# Patient Record
Sex: MALE | Race: WHITE | NOT HISPANIC OR LATINO | Employment: UNEMPLOYED | URBAN - METROPOLITAN AREA
[De-identification: names, ages, dates, MRNs, and addresses within clinical notes are randomized per-mention and may not be internally consistent; named-entity substitution may affect disease eponyms.]

---

## 2020-05-05 DIAGNOSIS — I10 ESSENTIAL HYPERTENSION: Primary | ICD-10-CM

## 2020-05-05 RX ORDER — HYDROCHLOROTHIAZIDE 25 MG/1
25 TABLET ORAL DAILY
COMMUNITY
End: 2020-05-05 | Stop reason: SDUPTHER

## 2020-05-05 RX ORDER — METOPROLOL SUCCINATE 100 MG/1
100 TABLET, EXTENDED RELEASE ORAL DAILY
COMMUNITY
End: 2020-05-05 | Stop reason: SDUPTHER

## 2020-05-05 RX ORDER — METOPROLOL SUCCINATE 100 MG/1
100 TABLET, EXTENDED RELEASE ORAL DAILY
Qty: 30 TABLET | Refills: 6 | Status: SHIPPED | OUTPATIENT
Start: 2020-05-05 | End: 2020-12-31

## 2020-05-05 RX ORDER — HYDROCHLOROTHIAZIDE 25 MG/1
25 TABLET ORAL DAILY
Qty: 30 TABLET | Refills: 6 | Status: SHIPPED | OUTPATIENT
Start: 2020-05-05 | End: 2021-01-12 | Stop reason: SDUPTHER

## 2020-12-29 ENCOUNTER — TELEPHONE (OUTPATIENT)
Dept: FAMILY MEDICINE CLINIC | Facility: CLINIC | Age: 60
End: 2020-12-29

## 2020-12-29 DIAGNOSIS — I10 ESSENTIAL HYPERTENSION: Primary | ICD-10-CM

## 2020-12-29 DIAGNOSIS — Z12.5 SCREENING PSA (PROSTATE SPECIFIC ANTIGEN): ICD-10-CM

## 2020-12-29 NOTE — TELEPHONE ENCOUNTER
Patient has an appt  On 1/12 and wants to get bloodwork done before his appt    Psa, diabetes, renal /liver emzymes- what you normally order for him,     Patients ph # [phone]

## 2020-12-31 DIAGNOSIS — I10 ESSENTIAL HYPERTENSION: ICD-10-CM

## 2020-12-31 RX ORDER — METOPROLOL SUCCINATE 100 MG/1
TABLET, EXTENDED RELEASE ORAL
Qty: 30 TABLET | Refills: 6 | Status: SHIPPED | OUTPATIENT
Start: 2020-12-31 | End: 2021-09-24

## 2021-01-11 ENCOUNTER — TRANSCRIBE ORDERS (OUTPATIENT)
Dept: LAB | Facility: CLINIC | Age: 61
End: 2021-01-11

## 2021-01-11 ENCOUNTER — LAB (OUTPATIENT)
Dept: LAB | Facility: CLINIC | Age: 61
End: 2021-01-11
Payer: COMMERCIAL

## 2021-01-11 DIAGNOSIS — I10 ESSENTIAL HYPERTENSION: ICD-10-CM

## 2021-01-11 DIAGNOSIS — Z12.5 SCREENING PSA (PROSTATE SPECIFIC ANTIGEN): ICD-10-CM

## 2021-01-11 LAB
ALBUMIN SERPL BCP-MCNC: 3.6 G/DL (ref 3.5–5)
ALP SERPL-CCNC: 106 U/L (ref 46–116)
ALT SERPL W P-5'-P-CCNC: 68 U/L (ref 12–78)
ANION GAP SERPL CALCULATED.3IONS-SCNC: 4 MMOL/L (ref 4–13)
AST SERPL W P-5'-P-CCNC: 36 U/L (ref 5–45)
BILIRUB SERPL-MCNC: 0.65 MG/DL (ref 0.2–1)
BUN SERPL-MCNC: 15 MG/DL (ref 5–25)
CALCIUM SERPL-MCNC: 8.8 MG/DL (ref 8.3–10.1)
CHLORIDE SERPL-SCNC: 104 MMOL/L (ref 100–108)
CHOLEST SERPL-MCNC: 192 MG/DL (ref 50–200)
CO2 SERPL-SCNC: 30 MMOL/L (ref 21–32)
CREAT SERPL-MCNC: 1 MG/DL (ref 0.6–1.3)
GFR SERPL CREATININE-BSD FRML MDRD: 83 ML/MIN/1.73SQ M
GLUCOSE P FAST SERPL-MCNC: 159 MG/DL (ref 65–99)
HDLC SERPL-MCNC: 39 MG/DL
LDLC SERPL CALC-MCNC: 119 MG/DL (ref 0–100)
NONHDLC SERPL-MCNC: 153 MG/DL
POTASSIUM SERPL-SCNC: 3.6 MMOL/L (ref 3.5–5.3)
PROT SERPL-MCNC: 7.2 G/DL (ref 6.4–8.2)
PSA SERPL-MCNC: 0.8 NG/ML (ref 0–4)
SODIUM SERPL-SCNC: 138 MMOL/L (ref 136–145)
TRIGL SERPL-MCNC: 170 MG/DL

## 2021-01-11 PROCEDURE — G0103 PSA SCREENING: HCPCS

## 2021-01-11 PROCEDURE — 36415 COLL VENOUS BLD VENIPUNCTURE: CPT

## 2021-01-11 PROCEDURE — 80053 COMPREHEN METABOLIC PANEL: CPT

## 2021-01-11 PROCEDURE — 80061 LIPID PANEL: CPT

## 2021-01-12 ENCOUNTER — OFFICE VISIT (OUTPATIENT)
Dept: FAMILY MEDICINE CLINIC | Facility: CLINIC | Age: 61
End: 2021-01-12
Payer: COMMERCIAL

## 2021-01-12 VITALS — WEIGHT: 270 LBS | BODY MASS INDEX: 40.92 KG/M2 | HEIGHT: 68 IN

## 2021-01-12 DIAGNOSIS — R73.09 ABNORMAL GLUCOSE: Primary | ICD-10-CM

## 2021-01-12 DIAGNOSIS — E78.5 DYSLIPIDEMIA: ICD-10-CM

## 2021-01-12 DIAGNOSIS — E66.09 OBESITY DUE TO EXCESS CALORIES WITHOUT SERIOUS COMORBIDITY, UNSPECIFIED CLASSIFICATION: ICD-10-CM

## 2021-01-12 DIAGNOSIS — I10 ESSENTIAL HYPERTENSION: ICD-10-CM

## 2021-01-12 PROCEDURE — 99214 OFFICE O/P EST MOD 30 MIN: CPT | Performed by: FAMILY MEDICINE

## 2021-01-12 RX ORDER — HYDROCHLOROTHIAZIDE 25 MG/1
25 TABLET ORAL DAILY
Qty: 30 TABLET | Refills: 6 | Status: SHIPPED | OUTPATIENT
Start: 2021-01-12 | End: 2021-10-12

## 2021-01-12 NOTE — PROGRESS NOTES
Virtual Regular Visit      Assessment/Plan:    Problem List Items Addressed This Visit        Cardiovascular and Mediastinum    Essential hypertension     Refill meds; pt to check BP at h omne if high will need to come in office for visit         Relevant Medications    hydrochlorothiazide (HYDRODIURIL) 25 mg tablet       Other    Abnormal glucose - Primary     Pt needs HGA1c ordered         Relevant Orders    Hemoglobin A1C    Dyslipidemia     Borderline chol and trigs cont low saturated fat diet         Obesity due to excess calories without serious comorbidity     Discussion on diet and exercise                    Reason for visit is  interval visit  Chief Complaint   Patient presents with    Hypertension    Virtual Regular Visit        Encounter provider Carlos Waldrop MD    Provider located at [address] [place] [id number] [phone]      Recent Visits  No visits were found meeting these conditions  Showing recent visits within past 7 days and meeting all other requirements     Today's Visits  Date Type Provider Dept   01/12/21 Office Visit Carlos Waldrop MD [address] today's visits and meeting all other requirements     Future Appointments  No visits were found meeting these conditions  Showing future appointments within next 150 days and meeting all other requirements        The patient was identified by name and date of birth  Dallas Persons was informed that this is a telemedicine visit and that the visit is being conducted through [address] [place] [address] and patient was informed that this is not a secure, HIPAA-compliant platform  He agrees to proceed     My office door was closed  No one else was in the room  He acknowledged consent and understanding of privacy and security of the video platform  The patient has agreed to participate and understands they can discontinue the visit at any time      Patient is aware this is a billable service  Subjective pt for interval visit  Has HTN   Sherley Frost is a 64 y o  male here for interval visit needs refill of meds pt has not been getting exercise pt does not think he gained weight  Pt had fasting blood glucose of 159 needs HGA1c pt has been eating a lot of candy       Past Medical History:   Diagnosis Date    Arthritis     High blood pressure     Hypertension     Obesity     LISA (obstructive sleep apnea)     Sleep disorder     lisa       Past Surgical History:   Procedure Laterality Date    COLONOSCOPY  11/04/2014    CORRECTION HAMMER TOE Right     great toe    JOINT REPLACEMENT      KNEE SURGERY Bilateral 12/01/2015    done at same time        Current Outpatient Medications   Medication Sig Dispense Refill    hydrochlorothiazide (HYDRODIURIL) 25 mg tablet Take 1 tablet (25 mg total) by mouth daily 30 tablet 6    metoprolol succinate (TOPROL-XL) 100 mg 24 hr tablet TAKE ONE TABLET BY MOUTH EVERY DAY 30 tablet 6     No current facility-administered medications for this visit  No Known Allergies    Review of Systems   Respiratory: Negative for cough, chest tightness and shortness of breath  Cardiovascular: Negative for chest pain, palpitations and leg swelling  Neurological: Negative for dizziness, weakness, light-headedness and headaches  Psychiatric/Behavioral: Negative for dysphoric mood  The patient is not nervous/anxious  Video Exam    Vitals:    01/12/21 1013   Weight: 122 kg (270 lb)   Height: 5' 8" (1 727 m)       Physical Exam  Constitutional:       General: He is not in acute distress  Appearance: Normal appearance  He is well-developed  He is obese  Neck:      Musculoskeletal: Normal range of motion  Thyroid: No thyromegaly  Cardiovascular:      Rate and Rhythm: Normal rate  Pulmonary:      Effort: Pulmonary effort is normal  No respiratory distress  Breath sounds: Normal breath sounds     Neurological: General: No focal deficit present  Mental Status: He is alert and oriented to person, place, and time  Mental status is at baseline  Psychiatric:         Mood and Affect: Mood normal          Behavior: Behavior normal          Thought Content: Thought content normal       BMI Counseling: Body mass index is 41 05 kg/m²  The BMI is above normal  Nutrition recommendations include 3-5 servings of fruits/vegetables daily, reducing fast food intake, consuming healthier snacks, decreasing soda and/or juice intake, moderation in carbohydrate intake and increasing intake of lean protein  Exercise recommendations include exercising 3-5 times per week and strength training exercises  I spent 20 minutes directly with the patient during this visit      VIRTUAL VISIT DISCLAIMER    Chetna Arevalo acknowledges that he has consented to an online visit or consultation  He understands that the online visit is based solely on information provided by him, and that, in the absence of a face-to-face physical evaluation by the physician, the diagnosis he receives is both limited and provisional in terms of accuracy and completeness  This is not intended to replace a full medical face-to-face evaluation by the physician  Chetna Arevalo understands and accepts these terms

## 2021-01-14 ENCOUNTER — LAB (OUTPATIENT)
Dept: LAB | Facility: CLINIC | Age: 61
End: 2021-01-14
Payer: COMMERCIAL

## 2021-01-14 DIAGNOSIS — R73.09 ABNORMAL GLUCOSE: ICD-10-CM

## 2021-01-14 DIAGNOSIS — E11.9 DIABETES MELLITUS WITHOUT COMPLICATION (HCC): Primary | ICD-10-CM

## 2021-01-14 LAB
EST. AVERAGE GLUCOSE BLD GHB EST-MCNC: 148 MG/DL
HBA1C MFR BLD: 6.8 %

## 2021-01-14 PROCEDURE — 36415 COLL VENOUS BLD VENIPUNCTURE: CPT

## 2021-01-14 PROCEDURE — 83036 HEMOGLOBIN GLYCOSYLATED A1C: CPT

## 2021-01-18 ENCOUNTER — TELEPHONE (OUTPATIENT)
Dept: FAMILY MEDICINE CLINIC | Facility: CLINIC | Age: 61
End: 2021-01-18

## 2021-03-10 DIAGNOSIS — Z23 ENCOUNTER FOR IMMUNIZATION: ICD-10-CM

## 2021-05-09 PROBLEM — E66.01 CLASS 3 SEVERE OBESITY DUE TO EXCESS CALORIES WITH SERIOUS COMORBIDITY AND BODY MASS INDEX (BMI) OF 40.0 TO 44.9 IN ADULT (HCC): Status: ACTIVE | Noted: 2021-05-09

## 2021-05-09 PROBLEM — Z00.00 ANNUAL PHYSICAL EXAM: Status: ACTIVE | Noted: 2021-05-09

## 2021-05-09 PROBLEM — E66.813 CLASS 3 SEVERE OBESITY DUE TO EXCESS CALORIES WITH SERIOUS COMORBIDITY AND BODY MASS INDEX (BMI) OF 40.0 TO 44.9 IN ADULT (HCC): Status: ACTIVE | Noted: 2021-05-09

## 2021-05-09 PROBLEM — E11.9 TYPE 2 DIABETES MELLITUS WITHOUT COMPLICATION, WITHOUT LONG-TERM CURRENT USE OF INSULIN (HCC): Status: ACTIVE | Noted: 2021-05-09

## 2021-05-09 NOTE — ASSESSMENT & PLAN NOTE
Lab Results   Component Value Date    HGBA1C 6 8 (H) 01/14/2021    HGA1c today 6 5  Pt very motivated to cont with diet and exercise will check again in 4 mo; needs labs  Pt refuses pneumonia vaccine

## 2021-05-09 NOTE — PROGRESS NOTES
Assessment/Plan:         Problem List Items Addressed This Visit        Endocrine    Type 2 diabetes mellitus without complication, without long-term current use of insulin ([address] )       Lab Results   Component Value Date    HGBA1C 6 8 (H) 01/14/2021    HGA1c today 6 5  Pt very motivated to cont with diet and exercise will check again in 4 mo; needs labs  Pt refuses pneumonia vaccine         Relevant Orders    Microalbumin / creatinine urine ratio    POCT hemoglobin A1c (Completed)       Other    Dyslipidemia     Check labs         Annual physical exam    Relevant Orders    Comprehensive metabolic panel    Lipid panel    PSA, Total Screen    CBC and differential    Urinalysis with microscopic    Class 3 severe obesity due to excess calories with serious comorbidity and body mass index (BMI) of 40 0 to 44 9 in [facility]) - Primary     Discussion on diet and exercise           Other Visit Diagnoses     History of colon polyps        Relevant Orders    Ambulatory referral for colonoscopy            Subjective:pt here for interval visit and annual physical htn hl DM pt states he is exercising every day     Patient ID: Beni Cunningham is a 64 y o  male  HPI    The following portions of the patient's history were reviewed and updated as appropriate:   Past Medical History:  He has a past medical history of Arthritis, High blood pressure, Hypertension, Obesity, LISA (obstructive sleep apnea), and Sleep disorder  ,  _______________________________________________________________________  Medical Problems:  does not have any pertinent problems on file ,  _______________________________________________________________________  Past Surgical History:   has a past surgical history that includes Correction hammer toe (Right); Joint replacement; Knee surgery (Bilateral, 12/01/2015); and Colonoscopy (11/04/2014)  ,  _______________________________________________________________________  Family History:  family history includes Alcohol abuse in his family; Arthritis in his mother; Breast cancer in his mother; Colon cancer in his mother; Diabetes in his paternal grandfather; Hypertension in his father; No Known Problems in his brother and sister; Prostate cancer in his father ,  _______________________________________________________________________  Social History:   reports that he has been smoking cigars  He has smoked for the past 40 00 years  He has never used smokeless tobacco  He reports current alcohol use of about 3 0 - 4 0 standard drinks of alcohol per week  He reports previous drug use ,  _______________________________________________________________________  Allergies:  has No Known Allergies     _______________________________________________________________________  Current Outpatient Medications   Medication Sig Dispense Refill    hydrochlorothiazide (HYDRODIURIL) 25 mg tablet Take 1 tablet (25 mg total) by mouth daily 30 tablet 6    metoprolol succinate (TOPROL-XL) 100 mg 24 hr tablet TAKE ONE TABLET BY MOUTH EVERY DAY 30 tablet 6     No current facility-administered medications for this visit       _______________________________________________________________________  Review of Systems   Constitutional: Negative for appetite change, chills, fatigue and fever  Respiratory: Negative for cough, chest tightness and shortness of breath  Cardiovascular: Negative for chest pain, palpitations and leg swelling  Gastrointestinal: Negative for abdominal pain, constipation, diarrhea, nausea and vomiting  Genitourinary: Negative for difficulty urinating and frequency  Musculoskeletal: Negative for arthralgias, back pain and neck pain  Skin: Negative for rash  Neurological: Negative for dizziness, weakness, light-headedness, numbness and headaches  Hematological: Does not bruise/bleed easily  Psychiatric/Behavioral: Negative for dysphoric mood and sleep disturbance  The patient is not nervous/anxious  Objective:  Vitals:    05/12/21 0935 05/12/21 0957   BP: 120/90 124/84   Pulse: 72    Temp: 98 1 °F (36 7 °C)    SpO2: 97%    Weight: 129 kg (284 lb)    Height: 5' 8" (1 727 m)      Body mass index is 43 18 kg/m²  Physical Exam  Constitutional:       General: He is not in acute distress  Appearance: Normal appearance  He is obese  He is not ill-appearing  HENT:      Right Ear: Tympanic membrane and external ear normal       Left Ear: Tympanic membrane and external ear normal       Nose: Nose normal       Mouth/Throat:      Mouth: Mucous membranes are moist    Eyes:      General:         Right eye: No discharge  Left eye: No discharge  Extraocular Movements: Extraocular movements intact  Conjunctiva/sclera: Conjunctivae normal       Pupils: Pupils are equal, round, and reactive to light  Neck:      Musculoskeletal: Normal range of motion and neck supple  Thyroid: No thyromegaly  Vascular: No carotid bruit  Trachea: No tracheal deviation  Cardiovascular:      Rate and Rhythm: Normal rate and regular rhythm  Pulses: Normal pulses  Heart sounds: Normal heart sounds  No murmur  Pulmonary:      Effort: Pulmonary effort is normal       Breath sounds: Normal breath sounds  No wheezing or rales  Chest:      Chest wall: No tenderness  Abdominal:      General: Bowel sounds are normal  There is no distension  Palpations: Abdomen is soft  There is no mass  Tenderness: There is no abdominal tenderness  Hernia: No hernia is present  There is no hernia in the left inguinal area  Genitourinary:     Penis: Normal        Scrotum/Testes: Normal       Prostate: Normal       Rectum: Normal    Musculoskeletal: Normal range of motion  Feet:      Right foot:      Skin integrity: Dry skin present  No ulcer, skin breakdown, erythema, warmth or callus  Left foot:      Skin integrity: Dry skin present  No ulcer, skin breakdown, erythema, warmth or callus  Lymphadenopathy:      Cervical: No cervical adenopathy  Lower Body: No right inguinal adenopathy  No left inguinal adenopathy  Skin:     General: Skin is warm and dry  Findings: No rash  Neurological:      General: No focal deficit present  Mental Status: He is alert and oriented to person, place, and time  Mental status is at baseline  Cranial Nerves: No cranial nerve deficit  Sensory: No sensory deficit  Motor: No weakness or abnormal muscle tone  Coordination: Coordination normal       Gait: Gait normal       Deep Tendon Reflexes: Reflexes normal    Psychiatric:         Mood and Affect: Mood normal          Behavior: Behavior normal        BMI Counseling: Body mass index is 43 18 kg/m²  The BMI is above normal  Nutrition recommendations include reducing portion sizes, 3-5 servings of fruits/vegetables daily, reducing fast food intake, consuming healthier snacks, decreasing soda and/or juice intake, moderation in carbohydrate intake and increasing intake of lean protein  Exercise recommendations include exercising 3-5 times per week and strength training exercises  Patient's shoes and socks removed  Right Foot/Ankle   Right Foot Inspection  Skin Exam: skin normal, skin intact and dry skin no warmth, no callus, no erythema, no maceration, no abnormal color, no pre-ulcer, no ulcer and no callus                          Toe Exam: no swelling, no tenderness, erythema and  no right toe deformity  Sensory   Vibration: intact  Proprioception: intact   Monofilament testing: intact    Right Toe  - Comprehensive Exam  Ecchymosis: none  Swelling: none   Tenderness: none         Left Foot/Ankle  Left Foot Inspection  Skin Exam: skin normal, skin intact and dry skinno warmth, no erythema, no maceration, normal color, no pre-ulcer, no ulcer and no callus                                         Sensory   Vibration: intact  Proprioception: intact  Monofilament: intact    Left Toe  - Comprehensive Exam  Ecchymosis: none  Swelling: none   Tenderness: none       Assign Risk Category:  No deformity present;  No loss of protective sensation;        Risk: 0

## 2021-05-12 ENCOUNTER — OFFICE VISIT (OUTPATIENT)
Dept: FAMILY MEDICINE CLINIC | Facility: CLINIC | Age: 61
End: 2021-05-12
Payer: COMMERCIAL

## 2021-05-12 VITALS
WEIGHT: 284 LBS | DIASTOLIC BLOOD PRESSURE: 84 MMHG | OXYGEN SATURATION: 97 % | TEMPERATURE: 98.1 F | HEART RATE: 72 BPM | HEIGHT: 68 IN | SYSTOLIC BLOOD PRESSURE: 124 MMHG | BODY MASS INDEX: 43.04 KG/M2

## 2021-05-12 DIAGNOSIS — Z00.00 ANNUAL PHYSICAL EXAM: ICD-10-CM

## 2021-05-12 DIAGNOSIS — E11.9 TYPE 2 DIABETES MELLITUS WITHOUT COMPLICATION, WITHOUT LONG-TERM CURRENT USE OF INSULIN (HCC): ICD-10-CM

## 2021-05-12 DIAGNOSIS — E78.5 DYSLIPIDEMIA: ICD-10-CM

## 2021-05-12 DIAGNOSIS — Z86.010 HISTORY OF COLON POLYPS: ICD-10-CM

## 2021-05-12 DIAGNOSIS — E66.01 CLASS 3 SEVERE OBESITY DUE TO EXCESS CALORIES WITH SERIOUS COMORBIDITY AND BODY MASS INDEX (BMI) OF 40.0 TO 44.9 IN ADULT (HCC): Primary | ICD-10-CM

## 2021-05-12 PROBLEM — R73.09 ABNORMAL GLUCOSE: Status: RESOLVED | Noted: 2021-01-12 | Resolved: 2021-05-12

## 2021-05-12 PROBLEM — E66.09 OBESITY DUE TO EXCESS CALORIES WITHOUT SERIOUS COMORBIDITY: Status: RESOLVED | Noted: 2021-01-12 | Resolved: 2021-05-12

## 2021-05-12 LAB — SL AMB POCT HEMOGLOBIN AIC: 6.5 (ref ?–6.5)

## 2021-05-12 PROCEDURE — 83036 HEMOGLOBIN GLYCOSYLATED A1C: CPT | Performed by: FAMILY MEDICINE

## 2021-05-12 PROCEDURE — 99396 PREV VISIT EST AGE 40-64: CPT | Performed by: FAMILY MEDICINE

## 2021-05-12 PROCEDURE — 36416 COLLJ CAPILLARY BLOOD SPEC: CPT | Performed by: FAMILY MEDICINE

## 2021-05-13 ENCOUNTER — TRANSCRIBE ORDERS (OUTPATIENT)
Dept: LAB | Facility: CLINIC | Age: 61
End: 2021-05-13

## 2021-05-13 ENCOUNTER — APPOINTMENT (OUTPATIENT)
Dept: LAB | Facility: CLINIC | Age: 61
End: 2021-05-13
Payer: COMMERCIAL

## 2021-05-13 DIAGNOSIS — Z00.00 ANNUAL PHYSICAL EXAM: ICD-10-CM

## 2021-05-13 DIAGNOSIS — E11.9 DIABETES MELLITUS WITHOUT COMPLICATION (HCC): ICD-10-CM

## 2021-05-13 LAB
ALBUMIN SERPL BCP-MCNC: 3.6 G/DL (ref 3.5–5)
ALP SERPL-CCNC: 89 U/L (ref 46–116)
ALT SERPL W P-5'-P-CCNC: 40 U/L (ref 12–78)
ANION GAP SERPL CALCULATED.3IONS-SCNC: 3 MMOL/L (ref 4–13)
AST SERPL W P-5'-P-CCNC: 25 U/L (ref 5–45)
BACTERIA UR QL AUTO: NORMAL /HPF
BASOPHILS # BLD AUTO: 0.05 THOUSANDS/ΜL (ref 0–0.1)
BASOPHILS NFR BLD AUTO: 1 % (ref 0–1)
BILIRUB SERPL-MCNC: 0.51 MG/DL (ref 0.2–1)
BILIRUB UR QL STRIP: NEGATIVE
BUN SERPL-MCNC: 14 MG/DL (ref 5–25)
CALCIUM SERPL-MCNC: 9.1 MG/DL (ref 8.3–10.1)
CHLORIDE SERPL-SCNC: 102 MMOL/L (ref 100–108)
CHOLEST SERPL-MCNC: 192 MG/DL (ref 50–200)
CLARITY UR: CLEAR
CO2 SERPL-SCNC: 32 MMOL/L (ref 21–32)
COLOR UR: YELLOW
CREAT SERPL-MCNC: 0.9 MG/DL (ref 0.6–1.3)
CREAT UR-MCNC: 144 MG/DL
EOSINOPHIL # BLD AUTO: 0.21 THOUSAND/ΜL (ref 0–0.61)
EOSINOPHIL NFR BLD AUTO: 3 % (ref 0–6)
ERYTHROCYTE [DISTWIDTH] IN BLOOD BY AUTOMATED COUNT: 13.1 % (ref 11.6–15.1)
EST. AVERAGE GLUCOSE BLD GHB EST-MCNC: 131 MG/DL
GFR SERPL CREATININE-BSD FRML MDRD: 94 ML/MIN/1.73SQ M
GLUCOSE P FAST SERPL-MCNC: 131 MG/DL (ref 65–99)
GLUCOSE UR STRIP-MCNC: NEGATIVE MG/DL
HBA1C MFR BLD: 6.2 %
HCT VFR BLD AUTO: 42.8 % (ref 36.5–49.3)
HDLC SERPL-MCNC: 50 MG/DL
HGB BLD-MCNC: 14.7 G/DL (ref 12–17)
HGB UR QL STRIP.AUTO: NEGATIVE
IMM GRANULOCYTES # BLD AUTO: 0.02 THOUSAND/UL (ref 0–0.2)
IMM GRANULOCYTES NFR BLD AUTO: 0 % (ref 0–2)
KETONES UR STRIP-MCNC: NEGATIVE MG/DL
LDLC SERPL CALC-MCNC: 118 MG/DL (ref 0–100)
LEUKOCYTE ESTERASE UR QL STRIP: NEGATIVE
LYMPHOCYTES # BLD AUTO: 2.57 THOUSANDS/ΜL (ref 0.6–4.47)
LYMPHOCYTES NFR BLD AUTO: 35 % (ref 14–44)
MCH RBC QN AUTO: 32.7 PG (ref 26.8–34.3)
MCHC RBC AUTO-ENTMCNC: 34.3 G/DL (ref 31.4–37.4)
MCV RBC AUTO: 95 FL (ref 82–98)
MICROALBUMIN UR-MCNC: <5 MG/L (ref 0–20)
MICROALBUMIN/CREAT 24H UR: <3 MG/G CREATININE (ref 0–30)
MONOCYTES # BLD AUTO: 0.64 THOUSAND/ΜL (ref 0.17–1.22)
MONOCYTES NFR BLD AUTO: 9 % (ref 4–12)
NEUTROPHILS # BLD AUTO: 3.77 THOUSANDS/ΜL (ref 1.85–7.62)
NEUTS SEG NFR BLD AUTO: 52 % (ref 43–75)
NITRITE UR QL STRIP: NEGATIVE
NON-SQ EPI CELLS URNS QL MICRO: NORMAL /HPF
NONHDLC SERPL-MCNC: 142 MG/DL
NRBC BLD AUTO-RTO: 0 /100 WBCS
PH UR STRIP.AUTO: 7 [PH]
PLATELET # BLD AUTO: 227 THOUSANDS/UL (ref 149–390)
PMV BLD AUTO: 9.8 FL (ref 8.9–12.7)
POTASSIUM SERPL-SCNC: 4 MMOL/L (ref 3.5–5.3)
PROT SERPL-MCNC: 7.1 G/DL (ref 6.4–8.2)
PROT UR STRIP-MCNC: NEGATIVE MG/DL
PSA SERPL-MCNC: 0.7 NG/ML (ref 0–4)
RBC # BLD AUTO: 4.5 MILLION/UL (ref 3.88–5.62)
RBC #/AREA URNS AUTO: NORMAL /HPF
SODIUM SERPL-SCNC: 137 MMOL/L (ref 136–145)
SP GR UR STRIP.AUTO: 1.02 (ref 1–1.03)
TRIGL SERPL-MCNC: 119 MG/DL
UROBILINOGEN UR QL STRIP.AUTO: 0.2 E.U./DL
WBC # BLD AUTO: 7.26 THOUSAND/UL (ref 4.31–10.16)
WBC #/AREA URNS AUTO: NORMAL /HPF

## 2021-05-13 PROCEDURE — 82570 ASSAY OF URINE CREATININE: CPT | Performed by: FAMILY MEDICINE

## 2021-05-13 PROCEDURE — 81001 URINALYSIS AUTO W/SCOPE: CPT | Performed by: FAMILY MEDICINE

## 2021-05-13 PROCEDURE — 83036 HEMOGLOBIN GLYCOSYLATED A1C: CPT

## 2021-05-13 PROCEDURE — G0103 PSA SCREENING: HCPCS

## 2021-05-13 PROCEDURE — 36415 COLL VENOUS BLD VENIPUNCTURE: CPT | Performed by: FAMILY MEDICINE

## 2021-05-13 PROCEDURE — 80061 LIPID PANEL: CPT | Performed by: FAMILY MEDICINE

## 2021-05-13 PROCEDURE — 85025 COMPLETE CBC W/AUTO DIFF WBC: CPT

## 2021-05-13 PROCEDURE — 80053 COMPREHEN METABOLIC PANEL: CPT | Performed by: FAMILY MEDICINE

## 2021-05-13 PROCEDURE — 82043 UR ALBUMIN QUANTITATIVE: CPT | Performed by: FAMILY MEDICINE

## 2021-07-09 ENCOUNTER — TELEPHONE (OUTPATIENT)
Dept: GASTROENTEROLOGY | Facility: CLINIC | Age: 61
End: 2021-07-09

## 2021-07-09 NOTE — TELEPHONE ENCOUNTER
Received order from Dr Ector Mcdonald for pt to be scheduled for colon screening, however  Pt is an established pt of Dr Rufina Hernandez and is overdue for colon/egd - hx of ji's/hx of adenomatous colon polyps  Called and spoke to pt whom is refusing to schedule  He said he will call when he is ready

## 2021-09-24 DIAGNOSIS — I10 ESSENTIAL HYPERTENSION: ICD-10-CM

## 2021-09-24 RX ORDER — METOPROLOL SUCCINATE 100 MG/1
TABLET, EXTENDED RELEASE ORAL
Qty: 30 TABLET | Refills: 6 | Status: SHIPPED | OUTPATIENT
Start: 2021-09-24 | End: 2022-05-23

## 2021-10-12 DIAGNOSIS — I10 ESSENTIAL HYPERTENSION: ICD-10-CM

## 2021-10-12 RX ORDER — HYDROCHLOROTHIAZIDE 25 MG/1
TABLET ORAL
Qty: 30 TABLET | Refills: 6 | Status: SHIPPED | OUTPATIENT
Start: 2021-10-12 | End: 2022-06-14

## 2021-12-21 ENCOUNTER — TELEPHONE (OUTPATIENT)
Dept: GASTROENTEROLOGY | Facility: CLINIC | Age: 61
End: 2021-12-21

## 2021-12-21 ENCOUNTER — TELEPHONE (OUTPATIENT)
Dept: FAMILY MEDICINE CLINIC | Facility: CLINIC | Age: 61
End: 2021-12-21

## 2021-12-21 DIAGNOSIS — B34.9 VIRAL INFECTION, UNSPECIFIED: Primary | ICD-10-CM

## 2021-12-22 PROCEDURE — U0005 INFEC AGEN DETEC AMPLI PROBE: HCPCS | Performed by: FAMILY MEDICINE

## 2021-12-22 PROCEDURE — U0003 INFECTIOUS AGENT DETECTION BY NUCLEIC ACID (DNA OR RNA); SEVERE ACUTE RESPIRATORY SYNDROME CORONAVIRUS 2 (SARS-COV-2) (CORONAVIRUS DISEASE [COVID-19]), AMPLIFIED PROBE TECHNIQUE, MAKING USE OF HIGH THROUGHPUT TECHNOLOGIES AS DESCRIBED BY CMS-2020-01-R: HCPCS | Performed by: FAMILY MEDICINE

## 2022-02-03 ENCOUNTER — OFFICE VISIT (OUTPATIENT)
Dept: FAMILY MEDICINE CLINIC | Facility: CLINIC | Age: 62
End: 2022-02-03
Payer: COMMERCIAL

## 2022-02-03 VITALS
DIASTOLIC BLOOD PRESSURE: 88 MMHG | HEART RATE: 75 BPM | RESPIRATION RATE: 16 BRPM | SYSTOLIC BLOOD PRESSURE: 130 MMHG | BODY MASS INDEX: 43.19 KG/M2 | WEIGHT: 285 LBS | HEIGHT: 68 IN | TEMPERATURE: 97.1 F | OXYGEN SATURATION: 98 %

## 2022-02-03 DIAGNOSIS — E11.9 TYPE 2 DIABETES MELLITUS WITHOUT COMPLICATION, WITHOUT LONG-TERM CURRENT USE OF INSULIN (HCC): Primary | ICD-10-CM

## 2022-02-03 DIAGNOSIS — E78.5 DYSLIPIDEMIA: ICD-10-CM

## 2022-02-03 DIAGNOSIS — E66.01 CLASS 3 SEVERE OBESITY DUE TO EXCESS CALORIES WITH SERIOUS COMORBIDITY AND BODY MASS INDEX (BMI) OF 40.0 TO 44.9 IN ADULT (HCC): ICD-10-CM

## 2022-02-03 DIAGNOSIS — F41.1 GENERALIZED ANXIETY DISORDER: ICD-10-CM

## 2022-02-03 DIAGNOSIS — I10 ESSENTIAL HYPERTENSION: ICD-10-CM

## 2022-02-03 PROCEDURE — 3075F SYST BP GE 130 - 139MM HG: CPT | Performed by: FAMILY MEDICINE

## 2022-02-03 PROCEDURE — 99214 OFFICE O/P EST MOD 30 MIN: CPT | Performed by: FAMILY MEDICINE

## 2022-02-03 PROCEDURE — 3008F BODY MASS INDEX DOCD: CPT | Performed by: FAMILY MEDICINE

## 2022-02-03 PROCEDURE — 3079F DIAST BP 80-89 MM HG: CPT | Performed by: FAMILY MEDICINE

## 2022-02-03 RX ORDER — METHYLPREDNISOLONE 4 MG/1
TABLET ORAL
COMMUNITY
Start: 2022-01-15 | End: 2022-02-03

## 2022-02-03 RX ORDER — ALPRAZOLAM 0.25 MG/1
0.25 TABLET ORAL
Qty: 20 TABLET | Refills: 0 | Status: SHIPPED | OUTPATIENT
Start: 2022-02-03 | End: 2022-03-08

## 2022-02-03 NOTE — ASSESSMENT & PLAN NOTE
Lab Results   Component Value Date    HGBA1C 6 2 (H) 05/13/2021   pt due for labs diet controlled HGA1c better

## 2022-02-03 NOTE — PROGRESS NOTES
Assessment/Plan:         Problem List Items Addressed This Visit        Endocrine    Type 2 diabetes mellitus without complication, without long-term current use of insulin ([facility] [address] ) - Primary       Lab Results   Component Value Date    HGBA1C 6 2 (H) 05/13/2021   pt due for labs         Relevant Orders    Comprehensive metabolic panel    Hemoglobin A1C    Lipid panel    Microalbumin / creatinine urine ratio       Cardiovascular and Mediastinum    Essential hypertension     Well controlled on current therapy continue with current medications and will reassess next visit              Other    Dyslipidemia     Borderline  elevated LDL         Class 3 severe obesity due to excess calories with serious comorbidity and body mass index (BMI) of 40 0 to 44 9 in adult ([facility] [address] )     Pt needs to lose weight diet and exercise         Generalized anxiety disorder     Needs refill of xanax         Relevant Medications    ALPRAZolam (XANAX) 0 25 mg tablet            Subjective: pt here for interval visit DM HTN HL  NEIDA     Patient ID: Maylin Jaimes is a 63 y o  male  HPI    The following portions of the patient's history were reviewed and updated as appropriate:   Past Medical History:  He has a past medical history of Arthritis, High blood pressure, Hypertension, Obesity, LISA (obstructive sleep apnea), and Sleep disorder  ,  _______________________________________________________________________  Medical Problems:  does not have any pertinent problems on file ,  _______________________________________________________________________  Past Surgical History:   has a past surgical history that includes Correction hammer toe (Right); Joint replacement; Knee surgery (Bilateral, 12/01/2015); and Colonoscopy (11/04/2014)  ,  _______________________________________________________________________  Family History:  family history includes Alcohol abuse in his family;  Arthritis in his mother; Breast cancer in his mother; Colon cancer in his mother; Diabetes in his paternal grandfather; Hypertension in his father; No Known Problems in his brother and sister; Prostate cancer in his father ,  _______________________________________________________________________  Social History:   reports that he has been smoking cigars  He has smoked for the past 40 00 years  He has never used smokeless tobacco  He reports current alcohol use of about 3 0 - 4 0 standard drinks of alcohol per week  He reports previous drug use ,  _______________________________________________________________________  Allergies:  has No Known Allergies     _______________________________________________________________________  Current Outpatient Medications   Medication Sig Dispense Refill    hydrochlorothiazide (HYDRODIURIL) 25 mg tablet TAKE ONE TABLET BY MOUTH EVERY DAY 30 tablet 6    metoprolol succinate (TOPROL-XL) 100 mg 24 hr tablet TAKE ONE TABLET BY MOUTH EVERY DAY 30 tablet 6    ALPRAZolam (XANAX) 0 25 mg tablet Take 1 tablet (0 25 mg total) by mouth daily at bedtime as needed for anxiety 20 tablet 0     No current facility-administered medications for this visit      _______________________________________________________________________  Review of Systems   Constitutional: Negative for appetite change, chills, fatigue and fever  Respiratory: Negative for cough, chest tightness and shortness of breath  Cardiovascular: Negative for chest pain, palpitations and leg swelling  Gastrointestinal: Negative for abdominal pain, constipation, diarrhea, nausea and vomiting  Genitourinary: Negative for difficulty urinating and frequency  Musculoskeletal: Negative for arthralgias, back pain and neck pain  Skin: Negative for rash  Neurological: Negative for dizziness, weakness, light-headedness, numbness and headaches  Hematological: Does not bruise/bleed easily  Psychiatric/Behavioral: Negative for dysphoric mood and sleep disturbance   The patient is not nervous/anxious  Objective:  Vitals:    02/03/22 0923   BP: 130/88   BP Location: Left arm   Patient Position: Sitting   Cuff Size: Standard   Pulse: 75   Resp: 16   Temp: (!) 97 1 °F (36 2 °C)   TempSrc: Temporal   SpO2: 98%   Weight: 129 kg (285 lb)   Height: 5' 8" (1 727 m)     Body mass index is 43 33 kg/m²  Physical Exam  Vitals reviewed  Constitutional:       General: He is not in acute distress  Appearance: Normal appearance  He is well-developed  He is obese  He is not ill-appearing  HENT:      Mouth/Throat:      Mouth: Mucous membranes are moist    Eyes:      Extraocular Movements: Extraocular movements intact  Conjunctiva/sclera: Conjunctivae normal       Pupils: Pupils are equal, round, and reactive to light  Neck:      Thyroid: No thyromegaly  Vascular: No carotid bruit  Cardiovascular:      Rate and Rhythm: Normal rate and regular rhythm  Pulses: Normal pulses  Heart sounds: Normal heart sounds  No murmur heard  Pulmonary:      Effort: Pulmonary effort is normal       Breath sounds: Normal breath sounds  Chest:      Chest wall: No tenderness  Abdominal:      General: Bowel sounds are normal  There is no distension  Palpations: Abdomen is soft  Tenderness: There is no abdominal tenderness  Musculoskeletal:      Cervical back: Normal range of motion and neck supple  Lymphadenopathy:      Cervical: No cervical adenopathy  Skin:     General: Skin is warm and dry  Neurological:      General: No focal deficit present  Mental Status: He is alert and oriented to person, place, and time  Mental status is at baseline  Cranial Nerves: No cranial nerve deficit     Psychiatric:         Mood and Affect: Mood normal          Behavior: Behavior normal

## 2022-02-28 ENCOUNTER — APPOINTMENT (OUTPATIENT)
Dept: LAB | Facility: CLINIC | Age: 62
End: 2022-02-28
Payer: COMMERCIAL

## 2022-02-28 DIAGNOSIS — E11.9 TYPE 2 DIABETES MELLITUS WITHOUT COMPLICATION, WITHOUT LONG-TERM CURRENT USE OF INSULIN (HCC): ICD-10-CM

## 2022-02-28 LAB
ALBUMIN SERPL BCP-MCNC: 3.8 G/DL (ref 3.5–5)
ALP SERPL-CCNC: 83 U/L (ref 46–116)
ALT SERPL W P-5'-P-CCNC: 57 U/L (ref 12–78)
ANION GAP SERPL CALCULATED.3IONS-SCNC: 10 MMOL/L (ref 4–13)
AST SERPL W P-5'-P-CCNC: 35 U/L (ref 5–45)
BILIRUB SERPL-MCNC: 0.92 MG/DL (ref 0.2–1)
BUN SERPL-MCNC: 13 MG/DL (ref 5–25)
CALCIUM SERPL-MCNC: 9.6 MG/DL (ref 8.3–10.1)
CHLORIDE SERPL-SCNC: 103 MMOL/L (ref 100–108)
CHOLEST SERPL-MCNC: 194 MG/DL
CO2 SERPL-SCNC: 24 MMOL/L (ref 21–32)
CREAT SERPL-MCNC: 1.02 MG/DL (ref 0.6–1.3)
CREAT UR-MCNC: 270 MG/DL
EST. AVERAGE GLUCOSE BLD GHB EST-MCNC: 148 MG/DL
GFR SERPL CREATININE-BSD FRML MDRD: 80 ML/MIN/1.73SQ M
GLUCOSE P FAST SERPL-MCNC: 151 MG/DL (ref 65–99)
HBA1C MFR BLD: 6.8 %
HDLC SERPL-MCNC: 54 MG/DL
LDLC SERPL CALC-MCNC: 97 MG/DL (ref 0–100)
MICROALBUMIN UR-MCNC: 5.4 MG/L (ref 0–20)
MICROALBUMIN/CREAT 24H UR: 2 MG/G CREATININE (ref 0–30)
NONHDLC SERPL-MCNC: 140 MG/DL
POTASSIUM SERPL-SCNC: 4.1 MMOL/L (ref 3.5–5.3)
PROT SERPL-MCNC: 7.5 G/DL (ref 6.4–8.2)
SODIUM SERPL-SCNC: 137 MMOL/L (ref 136–145)
TRIGL SERPL-MCNC: 214 MG/DL

## 2022-02-28 PROCEDURE — 36415 COLL VENOUS BLD VENIPUNCTURE: CPT

## 2022-02-28 PROCEDURE — 82570 ASSAY OF URINE CREATININE: CPT

## 2022-02-28 PROCEDURE — 3061F NEG MICROALBUMINURIA REV: CPT | Performed by: FAMILY MEDICINE

## 2022-02-28 PROCEDURE — 3044F HG A1C LEVEL LT 7.0%: CPT | Performed by: FAMILY MEDICINE

## 2022-02-28 PROCEDURE — 83036 HEMOGLOBIN GLYCOSYLATED A1C: CPT

## 2022-02-28 PROCEDURE — 82043 UR ALBUMIN QUANTITATIVE: CPT

## 2022-02-28 PROCEDURE — 80061 LIPID PANEL: CPT

## 2022-02-28 PROCEDURE — 80053 COMPREHEN METABOLIC PANEL: CPT

## 2022-03-01 DIAGNOSIS — E11.9 TYPE 2 DIABETES MELLITUS WITHOUT COMPLICATION, WITHOUT LONG-TERM CURRENT USE OF INSULIN (HCC): Primary | ICD-10-CM

## 2022-03-01 RX ORDER — METFORMIN HYDROCHLORIDE 500 MG/1
500 TABLET, EXTENDED RELEASE ORAL
Qty: 30 TABLET | Refills: 2 | Status: SHIPPED | OUTPATIENT
Start: 2022-03-01 | End: 2022-07-05

## 2022-03-08 DIAGNOSIS — F41.1 GENERALIZED ANXIETY DISORDER: ICD-10-CM

## 2022-03-08 RX ORDER — ALPRAZOLAM 0.25 MG/1
TABLET ORAL
Qty: 20 TABLET | Refills: 0 | Status: SHIPPED | OUTPATIENT
Start: 2022-03-08 | End: 2022-07-11 | Stop reason: SDUPTHER

## 2022-05-13 ENCOUNTER — TELEPHONE (OUTPATIENT)
Dept: GASTROENTEROLOGY | Facility: CLINIC | Age: 62
End: 2022-05-13

## 2022-05-13 NOTE — TELEPHONE ENCOUNTER
I lmom for pt to please call back to schedule his procedures with Dr Jane Menard  Pt is due for a Colon/EGD for hx of colon polyps/GERD/Mancia's

## 2022-05-13 NOTE — TELEPHONE ENCOUNTER
Patients GI provider:  Dr Hyacinth Chacko    Number to return call: [phone]    Reason for call: Pt calling to schedule procedure      Scheduled procedure/appointment date if applicable: N/A

## 2022-05-13 NOTE — TELEPHONE ENCOUNTER
Patients GI provider:  Dr Charles Stovall    Number to return call: [phone]     Reason for call: Pt calling returning a call to schedule his procedure    Scheduled procedure/appointment date if applicable: Apt/procedure n/a

## 2022-05-16 ENCOUNTER — TELEPHONE (OUTPATIENT)
Dept: GASTROENTEROLOGY | Facility: CLINIC | Age: 62
End: 2022-05-16

## 2022-05-16 NOTE — TELEPHONE ENCOUNTER
[facility] Assessment    Name: Debbie Cruz  YOB: 1960  Last Height: 5' 8" (1 727 m)  Last weight: 270   BMI: 41 0  Procedure: Colon/EGD   Diagnosis: Mancia's/GERD/hx of colon polyps  Date of procedure: 7/12/22  Prep: miralax w/ dul  Responsible : yes  Phone#: [phone]  Name completing form: Alyson Smith  Date form completed: 05/16/22      If the patient answers yes to any of these questions, schedule in a hospital  Are you pregnant: No  Do you rely on a wheelchair for mobility: No  Have you been diagnosed with End Stage Renal Disease (ESRD): No  Do you need oxygen during the day: No  Have you had a heart attack or stroke within the past three months: No  Have you had a seizure within the past three months: No  Have you ever been informed by anesthesia that you have a difficult airway: No  Additional Questions  Have you had any cardiac testing or are under the care of a Cardiologist (see cardiac list): No  Cardiac list:   Do you have an implanted cardiac defibrillator: No (Comment:  This patient should be scheduled in the hospital)    Have any bleeding problems, such as anemia or hemophilia (If patient has H&H result below 8, schedule in hospital   H&H must be within 30 days of procedure): No    Had an organ transplant within the past 3 months: No    Do you have any present infections: No  Do you get short of breath when walking a few blocks: No  Have you been diagnosed with diabetes: Yes (Comment: prediabetic)  Comments (provide cardiac provider information if applicable):

## 2022-05-16 NOTE — TELEPHONE ENCOUNTER
Scheduled date of EGD/colonoscopy (as of today): 7/12/22  Physician performing EGD/colonoscopy: Dr Ariana Richard   Location of EGD/colonoscopy:[facility]  Desired bowel prep reviewed with patient: Miralax w/ dul  Instructions reviewed with patient by: carlos  Clearances:  n/a

## 2022-05-22 DIAGNOSIS — I10 ESSENTIAL HYPERTENSION: ICD-10-CM

## 2022-05-23 RX ORDER — METOPROLOL SUCCINATE 100 MG/1
TABLET, EXTENDED RELEASE ORAL
Qty: 30 TABLET | Refills: 6 | Status: SHIPPED | OUTPATIENT
Start: 2022-05-23 | End: 2022-06-30 | Stop reason: SDUPTHER

## 2022-05-27 ENCOUNTER — RA CDI HCC (OUTPATIENT)
Dept: OTHER | Facility: HOSPITAL | Age: 62
End: 2022-05-27

## 2022-06-09 ENCOUNTER — OFFICE VISIT (OUTPATIENT)
Dept: FAMILY MEDICINE CLINIC | Facility: CLINIC | Age: 62
End: 2022-06-09
Payer: COMMERCIAL

## 2022-06-09 VITALS
HEIGHT: 68 IN | WEIGHT: 287 LBS | TEMPERATURE: 98.2 F | DIASTOLIC BLOOD PRESSURE: 92 MMHG | RESPIRATION RATE: 16 BRPM | OXYGEN SATURATION: 97 % | HEART RATE: 72 BPM | BODY MASS INDEX: 43.5 KG/M2 | SYSTOLIC BLOOD PRESSURE: 132 MMHG

## 2022-06-09 DIAGNOSIS — Z23 ENCOUNTER FOR IMMUNIZATION: ICD-10-CM

## 2022-06-09 DIAGNOSIS — F41.1 GENERALIZED ANXIETY DISORDER: ICD-10-CM

## 2022-06-09 DIAGNOSIS — E11.9 TYPE 2 DIABETES MELLITUS WITHOUT COMPLICATION, WITHOUT LONG-TERM CURRENT USE OF INSULIN (HCC): ICD-10-CM

## 2022-06-09 DIAGNOSIS — Z11.59 NEED FOR HEPATITIS C SCREENING TEST: Primary | ICD-10-CM

## 2022-06-09 DIAGNOSIS — Z00.00 ANNUAL PHYSICAL EXAM: ICD-10-CM

## 2022-06-09 DIAGNOSIS — E66.01 CLASS 3 SEVERE OBESITY DUE TO EXCESS CALORIES WITH SERIOUS COMORBIDITY AND BODY MASS INDEX (BMI) OF 40.0 TO 44.9 IN ADULT (HCC): ICD-10-CM

## 2022-06-09 DIAGNOSIS — I10 ESSENTIAL HYPERTENSION: ICD-10-CM

## 2022-06-09 DIAGNOSIS — E78.5 DYSLIPIDEMIA: ICD-10-CM

## 2022-06-09 DIAGNOSIS — T78.40XA ALLERGY, INITIAL ENCOUNTER: ICD-10-CM

## 2022-06-09 PROCEDURE — 3008F BODY MASS INDEX DOCD: CPT | Performed by: FAMILY MEDICINE

## 2022-06-09 PROCEDURE — 3075F SYST BP GE 130 - 139MM HG: CPT | Performed by: FAMILY MEDICINE

## 2022-06-09 PROCEDURE — 90471 IMMUNIZATION ADMIN: CPT | Performed by: FAMILY MEDICINE

## 2022-06-09 PROCEDURE — 99396 PREV VISIT EST AGE 40-64: CPT | Performed by: FAMILY MEDICINE

## 2022-06-09 PROCEDURE — 90677 PCV20 VACCINE IM: CPT | Performed by: FAMILY MEDICINE

## 2022-06-09 PROCEDURE — 3080F DIAST BP >= 90 MM HG: CPT | Performed by: FAMILY MEDICINE

## 2022-06-09 PROCEDURE — 3725F SCREEN DEPRESSION PERFORMED: CPT | Performed by: FAMILY MEDICINE

## 2022-06-09 RX ORDER — CETIRIZINE HYDROCHLORIDE 10 MG/1
10 TABLET ORAL DAILY
COMMUNITY

## 2022-06-09 RX ORDER — ALPRAZOLAM 0.25 MG/1
0.25 TABLET ORAL
Qty: 20 TABLET | Refills: 0 | Status: CANCELLED | OUTPATIENT
Start: 2022-06-09

## 2022-06-09 NOTE — PROGRESS NOTES
Assessment/Plan:         Problem List Items Addressed This Visit        Endocrine    Type 2 diabetes mellitus without complication, without long-term current use of insulin ([address] )       Lab Results   Component Value Date    HGBA1C 6 8 (H) 02/28/2022   check labs           Relevant Orders    Hemoglobin A1C       Cardiovascular and Mediastinum    Essential hypertension     Borderline today pt to watch DASH diet recheck 1 mo           Relevant Orders    Basic metabolic panel       Other    Dyslipidemia     Labs ok           Annual physical exam     Check routine labs             Relevant Orders    PSA, Total Screen    Urinalysis with microscopic    CBC and differential    Class 3 severe obesity due to excess calories with serious comorbidity and body mass index (BMI) of 40 0 to 44 9 in adult [facility])     Discussion on diet and exercise guidelines for weight loss and  health reviewed with pt              Generalized anxiety disorder     Xanax prn           Allergies     Add flonase to zyrtec             Other Visit Diagnoses     Need for hepatitis C screening test    -  Primary    Relevant Orders    Hepatitis C Antibody ([facility])    Encounter for immunization        Relevant Orders    Pneumococcal Conjugate Vaccine 20-valent (PCV20)            Subjective: pt here for physical and interval visit  HTN DM allergies not controlled with zyrtec sneeze cough drip      Patient ID: Kenna Saravia is a 63 y o  male  HPI    The following portions of the patient's history were reviewed and updated as appropriate:   Past Medical History:  He has a past medical history of Arthritis, High blood pressure, Hypertension, Obesity, LISA (obstructive sleep apnea), and Sleep disorder  ,  _______________________________________________________________________  Medical Problems:  does not have any pertinent problems on file ,  _______________________________________________________________________  Past Surgical History:   has a past surgical history that includes Correction hammer toe (Right); Joint replacement; Knee surgery (Bilateral, 12/01/2015); and Colonoscopy (11/04/2014)  ,  _______________________________________________________________________  Family History:  family history includes Alcohol abuse in his family; Arthritis in his mother; Breast cancer in his mother; Colon cancer in his mother; Diabetes in his paternal grandfather; Hypertension in his father; No Known Problems in his brother and sister; Prostate cancer in his father ,  _______________________________________________________________________  Social History:   reports that he has been smoking cigars  He has smoked for the past 40 00 years  He has never used smokeless tobacco  He reports current alcohol use of about 3 0 - 4 0 standard drinks of alcohol per week  He reports previous drug use ,  _______________________________________________________________________  Allergies:  has No Known Allergies     _______________________________________________________________________  Current Outpatient Medications   Medication Sig Dispense Refill    ALPRAZolam (XANAX) 0 25 mg tablet TAKE ONE TABLET BY MOUTH AT BEDTIME AS NEEDED FOR ANXIETY 20 tablet 0    cetirizine (ZyrTEC) 10 mg tablet Take 10 mg by mouth daily      hydrochlorothiazide (HYDRODIURIL) 25 mg tablet TAKE ONE TABLET BY MOUTH EVERY DAY 30 tablet 6    metFORMIN (GLUCOPHAGE-XR) 500 mg 24 hr tablet Take 1 tablet (500 mg total) by mouth daily with dinner 30 tablet 2    metoprolol succinate (TOPROL-XL) 100 mg 24 hr tablet TAKE ONE TABLET BY MOUTH EVERY DAY 30 tablet 6     No current facility-administered medications for this visit      _______________________________________________________________________  Review of Systems   Constitutional: Negative for appetite change, chills, fatigue and fever  Respiratory: Negative for cough, chest tightness and shortness of breath      Cardiovascular: Negative for chest pain, palpitations and leg swelling  Gastrointestinal: Negative for abdominal pain, constipation, diarrhea, nausea and vomiting  Genitourinary: Negative for difficulty urinating and frequency  Musculoskeletal: Negative for arthralgias, back pain and neck pain  Skin: Negative for rash  Neurological: Negative for dizziness, weakness, light-headedness, numbness and headaches  Hematological: Does not bruise/bleed easily  Psychiatric/Behavioral: Negative for dysphoric mood and sleep disturbance  The patient is not nervous/anxious  Objective:  Vitals:    06/09/22 0841 06/09/22 0853   BP: 140/100 132/92   BP Location: Left arm    Patient Position: Sitting    Cuff Size: Large    Pulse: 72    Resp: 16    Temp: 98 2 °F (36 8 °C)    TempSrc: Temporal    SpO2: 97%    Weight: 130 kg (287 lb)    Height: 5' 8" (1 727 m)      Body mass index is 43 64 kg/m²  Physical Exam  Constitutional:       General: He is not in acute distress  Appearance: Normal appearance  He is obese  He is not ill-appearing  HENT:      Right Ear: Tympanic membrane and external ear normal       Left Ear: Tympanic membrane and external ear normal       Nose: Nose normal       Mouth/Throat:      Mouth: Mucous membranes are moist    Eyes:      General:         Right eye: No discharge  Left eye: No discharge  Extraocular Movements: Extraocular movements intact  Conjunctiva/sclera: Conjunctivae normal       Pupils: Pupils are equal, round, and reactive to light  Neck:      Thyroid: No thyromegaly  Vascular: No carotid bruit  Trachea: No tracheal deviation  Cardiovascular:      Rate and Rhythm: Normal rate and regular rhythm  Pulses: Normal pulses  no weak pulses          Dorsalis pedis pulses are 2+ on the right side and 2+ on the left side  Heart sounds: Normal heart sounds  No murmur heard  Pulmonary:      Effort: Pulmonary effort is normal       Breath sounds: Normal breath sounds   No wheezing or rales  Chest:      Chest wall: No tenderness  Breasts:      Right: Normal       Left: Normal        Abdominal:      General: Bowel sounds are normal  There is no distension  Palpations: Abdomen is soft  There is no mass  Tenderness: There is no abdominal tenderness  Hernia: No hernia is present  There is no hernia in the left inguinal area  Genitourinary:     Penis: Normal        Testes: Normal       Prostate: Normal       Rectum: Normal    Musculoskeletal:         General: Normal range of motion  Cervical back: Normal range of motion and neck supple  Right lower leg: No edema  Left lower leg: No edema  Feet:      Right foot:      Skin integrity: Dry skin present  No ulcer, skin breakdown, erythema, warmth or callus  Left foot:      Skin integrity: Dry skin present  No ulcer, skin breakdown, erythema, warmth or callus  Lymphadenopathy:      Cervical: No cervical adenopathy  Lower Body: No right inguinal adenopathy  No left inguinal adenopathy  Skin:     General: Skin is warm and dry  Findings: No rash  Comments: No abn moles   Neurological:      General: No focal deficit present  Mental Status: He is alert and oriented to person, place, and time  Mental status is at baseline  Cranial Nerves: No cranial nerve deficit  Sensory: No sensory deficit  Motor: No weakness or abnormal muscle tone  Coordination: Coordination normal       Gait: Gait normal       Deep Tendon Reflexes: Reflexes normal    Psychiatric:         Mood and Affect: Mood normal          Behavior: Behavior normal        Patient's shoes and socks removed  Right Foot/Ankle   Right Foot Inspection  Skin Exam: skin normal, skin intact and dry skin  No warmth, no callus, no erythema, no maceration, no abnormal color, no pre-ulcer, no ulcer and no callus       Toe Exam: No swelling, no tenderness, erythema and  no right toe deformity    Sensory   Vibration: intact  Proprioception: intact  Monofilament testing: intact    Vascular  The right DP pulse is 2+  Right Toe  - Comprehensive Exam  Ecchymosis: none  Swelling: none   Tenderness: none         Left Foot/Ankle  Left Foot Inspection  Skin Exam: skin normal, skin intact and dry skin  No warmth, no erythema, no maceration, normal color, no pre-ulcer, no ulcer and no callus  Sensory   Vibration: intact  Proprioception: intact  Monofilament testing: intact    Vascular  The left DP pulse is 2+       Left Toe  - Comprehensive Exam  Ecchymosis: none  Swelling: none   Tenderness: none           Assign Risk Category  No deformity present  No loss of protective sensation  No weak pulses  Risk: 0

## 2022-06-10 ENCOUNTER — APPOINTMENT (OUTPATIENT)
Dept: LAB | Facility: CLINIC | Age: 62
End: 2022-06-10
Payer: COMMERCIAL

## 2022-06-10 DIAGNOSIS — Z11.59 NEED FOR HEPATITIS C SCREENING TEST: ICD-10-CM

## 2022-06-10 DIAGNOSIS — Z00.00 ANNUAL PHYSICAL EXAM: ICD-10-CM

## 2022-06-10 DIAGNOSIS — I10 ESSENTIAL HYPERTENSION: ICD-10-CM

## 2022-06-10 DIAGNOSIS — E11.9 TYPE 2 DIABETES MELLITUS WITHOUT COMPLICATION, WITHOUT LONG-TERM CURRENT USE OF INSULIN (HCC): ICD-10-CM

## 2022-06-10 LAB
ANION GAP SERPL CALCULATED.3IONS-SCNC: 5 MMOL/L (ref 4–13)
BACTERIA UR QL AUTO: ABNORMAL /HPF
BASOPHILS # BLD AUTO: 0.04 THOUSANDS/ΜL (ref 0–0.1)
BASOPHILS NFR BLD AUTO: 1 % (ref 0–1)
BILIRUB UR QL STRIP: NEGATIVE
BUN SERPL-MCNC: 12 MG/DL (ref 5–25)
CALCIUM SERPL-MCNC: 9.4 MG/DL (ref 8.3–10.1)
CHLORIDE SERPL-SCNC: 107 MMOL/L (ref 100–108)
CLARITY UR: CLEAR
CO2 SERPL-SCNC: 28 MMOL/L (ref 21–32)
COLOR UR: YELLOW
CREAT SERPL-MCNC: 0.95 MG/DL (ref 0.6–1.3)
EOSINOPHIL # BLD AUTO: 0.18 THOUSAND/ΜL (ref 0–0.61)
EOSINOPHIL NFR BLD AUTO: 3 % (ref 0–6)
ERYTHROCYTE [DISTWIDTH] IN BLOOD BY AUTOMATED COUNT: 12.4 % (ref 11.6–15.1)
EST. AVERAGE GLUCOSE BLD GHB EST-MCNC: 140 MG/DL
GFR SERPL CREATININE-BSD FRML MDRD: 86 ML/MIN/1.73SQ M
GLUCOSE P FAST SERPL-MCNC: 132 MG/DL (ref 65–99)
GLUCOSE UR STRIP-MCNC: NEGATIVE MG/DL
HBA1C MFR BLD: 6.5 %
HCT VFR BLD AUTO: 41.2 % (ref 36.5–49.3)
HCV AB SER QL: NORMAL
HGB BLD-MCNC: 14.4 G/DL (ref 12–17)
HGB UR QL STRIP.AUTO: NEGATIVE
IMM GRANULOCYTES # BLD AUTO: 0.02 THOUSAND/UL (ref 0–0.2)
IMM GRANULOCYTES NFR BLD AUTO: 0 % (ref 0–2)
KETONES UR STRIP-MCNC: NEGATIVE MG/DL
LEUKOCYTE ESTERASE UR QL STRIP: NEGATIVE
LYMPHOCYTES # BLD AUTO: 1.93 THOUSANDS/ΜL (ref 0.6–4.47)
LYMPHOCYTES NFR BLD AUTO: 29 % (ref 14–44)
MCH RBC QN AUTO: 33.2 PG (ref 26.8–34.3)
MCHC RBC AUTO-ENTMCNC: 35 G/DL (ref 31.4–37.4)
MCV RBC AUTO: 95 FL (ref 82–98)
MONOCYTES # BLD AUTO: 0.58 THOUSAND/ΜL (ref 0.17–1.22)
MONOCYTES NFR BLD AUTO: 9 % (ref 4–12)
NEUTROPHILS # BLD AUTO: 4.03 THOUSANDS/ΜL (ref 1.85–7.62)
NEUTS SEG NFR BLD AUTO: 58 % (ref 43–75)
NITRITE UR QL STRIP: NEGATIVE
NON-SQ EPI CELLS URNS QL MICRO: ABNORMAL /HPF
NRBC BLD AUTO-RTO: 0 /100 WBCS
PH UR STRIP.AUTO: 6 [PH]
PLATELET # BLD AUTO: 237 THOUSANDS/UL (ref 149–390)
PMV BLD AUTO: 9.9 FL (ref 8.9–12.7)
POTASSIUM SERPL-SCNC: 3.7 MMOL/L (ref 3.5–5.3)
PROT UR STRIP-MCNC: ABNORMAL MG/DL
PSA SERPL-MCNC: 0.7 NG/ML (ref 0–4)
RBC # BLD AUTO: 4.34 MILLION/UL (ref 3.88–5.62)
RBC #/AREA URNS AUTO: ABNORMAL /HPF
SODIUM SERPL-SCNC: 140 MMOL/L (ref 136–145)
SP GR UR STRIP.AUTO: 1.02 (ref 1–1.03)
UROBILINOGEN UR STRIP-ACNC: <2 MG/DL
WBC # BLD AUTO: 6.78 THOUSAND/UL (ref 4.31–10.16)
WBC #/AREA URNS AUTO: ABNORMAL /HPF

## 2022-06-10 PROCEDURE — G0103 PSA SCREENING: HCPCS

## 2022-06-10 PROCEDURE — 85025 COMPLETE CBC W/AUTO DIFF WBC: CPT

## 2022-06-10 PROCEDURE — 36415 COLL VENOUS BLD VENIPUNCTURE: CPT

## 2022-06-10 PROCEDURE — 86803 HEPATITIS C AB TEST: CPT

## 2022-06-10 PROCEDURE — 83036 HEMOGLOBIN GLYCOSYLATED A1C: CPT

## 2022-06-10 PROCEDURE — 80048 BASIC METABOLIC PNL TOTAL CA: CPT

## 2022-06-10 PROCEDURE — 81001 URINALYSIS AUTO W/SCOPE: CPT | Performed by: FAMILY MEDICINE

## 2022-06-10 PROCEDURE — 3044F HG A1C LEVEL LT 7.0%: CPT | Performed by: FAMILY MEDICINE

## 2022-06-14 DIAGNOSIS — I10 ESSENTIAL HYPERTENSION: ICD-10-CM

## 2022-06-14 RX ORDER — HYDROCHLOROTHIAZIDE 25 MG/1
TABLET ORAL
Qty: 30 TABLET | Refills: 6 | Status: SHIPPED | OUTPATIENT
Start: 2022-06-14

## 2022-06-22 LAB
LEFT EYE DIABETIC RETINOPATHY: NORMAL
RIGHT EYE DIABETIC RETINOPATHY: NORMAL

## 2022-06-30 ENCOUNTER — TELEPHONE (OUTPATIENT)
Dept: FAMILY MEDICINE CLINIC | Facility: CLINIC | Age: 62
End: 2022-06-30

## 2022-06-30 DIAGNOSIS — I10 ESSENTIAL HYPERTENSION: ICD-10-CM

## 2022-06-30 RX ORDER — METOPROLOL SUCCINATE 100 MG/1
100 TABLET, EXTENDED RELEASE ORAL DAILY
Qty: 90 TABLET | Refills: 2 | Status: SHIPPED | OUTPATIENT
Start: 2022-06-30

## 2022-06-30 NOTE — TELEPHONE ENCOUNTER
Refill:  Metoprolol Succinate 100mg  #90   (insurance will only cover #90)  [facility]

## 2022-07-05 DIAGNOSIS — E11.9 TYPE 2 DIABETES MELLITUS WITHOUT COMPLICATION, WITHOUT LONG-TERM CURRENT USE OF INSULIN (HCC): ICD-10-CM

## 2022-07-05 RX ORDER — METFORMIN HYDROCHLORIDE 500 MG/1
TABLET, EXTENDED RELEASE ORAL
Qty: 30 TABLET | Refills: 2 | Status: SHIPPED | OUTPATIENT
Start: 2022-07-05 | End: 2022-10-17

## 2022-07-11 ENCOUNTER — OFFICE VISIT (OUTPATIENT)
Dept: FAMILY MEDICINE CLINIC | Facility: CLINIC | Age: 62
End: 2022-07-11
Payer: COMMERCIAL

## 2022-07-11 VITALS
DIASTOLIC BLOOD PRESSURE: 88 MMHG | RESPIRATION RATE: 20 BRPM | TEMPERATURE: 98.9 F | OXYGEN SATURATION: 94 % | WEIGHT: 282 LBS | HEIGHT: 68 IN | SYSTOLIC BLOOD PRESSURE: 129 MMHG | HEART RATE: 72 BPM | BODY MASS INDEX: 42.74 KG/M2

## 2022-07-11 DIAGNOSIS — Z23 ENCOUNTER FOR IMMUNIZATION: Primary | ICD-10-CM

## 2022-07-11 DIAGNOSIS — F41.1 GENERALIZED ANXIETY DISORDER: ICD-10-CM

## 2022-07-11 DIAGNOSIS — E66.01 CLASS 3 SEVERE OBESITY DUE TO EXCESS CALORIES WITH SERIOUS COMORBIDITY AND BODY MASS INDEX (BMI) OF 40.0 TO 44.9 IN ADULT (HCC): ICD-10-CM

## 2022-07-11 DIAGNOSIS — I10 ESSENTIAL HYPERTENSION: ICD-10-CM

## 2022-07-11 PROCEDURE — 90471 IMMUNIZATION ADMIN: CPT | Performed by: FAMILY MEDICINE

## 2022-07-11 PROCEDURE — 3079F DIAST BP 80-89 MM HG: CPT | Performed by: FAMILY MEDICINE

## 2022-07-11 PROCEDURE — 3074F SYST BP LT 130 MM HG: CPT | Performed by: FAMILY MEDICINE

## 2022-07-11 PROCEDURE — 99213 OFFICE O/P EST LOW 20 MIN: CPT | Performed by: FAMILY MEDICINE

## 2022-07-11 PROCEDURE — 90715 TDAP VACCINE 7 YRS/> IM: CPT | Performed by: FAMILY MEDICINE

## 2022-07-11 RX ORDER — ALPRAZOLAM 0.25 MG/1
0.25 TABLET ORAL
Qty: 30 TABLET | Refills: 0 | Status: SHIPPED | OUTPATIENT
Start: 2022-07-11

## 2022-07-11 NOTE — PROGRESS NOTES
Assessment/Plan:         Problem List Items Addressed This Visit        Cardiovascular and Mediastinum    Essential hypertension     Improved to day cont DASH diet lose weight               Other    Class 3 severe obesity due to excess calories with serious comorbidity and body mass index (BMI) of 40 0 to 44 9 in adult ([address] )     Pt already lost a few pounds cont diet and exercise           Generalized anxiety disorder     Doing well with prn xanax needs refill           Relevant Medications    ALPRAZolam (XANAX) 0 25 mg tablet      Other Visit Diagnoses     Encounter for immunization    -  Primary    Relevant Orders    TDAP VACCINE GREATER THAN OR EQUAL TO 6YO IM            Subjective: pt here for reevaluation of uncontrrolled HTN pt has lost a few pounds  Is exercises      Patient ID: Anny Sanchez is a 63 y o  male  HPI    The following portions of the patient's history were reviewed and updated as appropriate:   Past Medical History:  He has a past medical history of Arthritis, High blood pressure, Hypertension, Obesity, LISA (obstructive sleep apnea), and Sleep disorder  ,  _______________________________________________________________________  Medical Problems:  does not have any pertinent problems on file ,  _______________________________________________________________________  Past Surgical History:   has a past surgical history that includes Correction hammer toe (Right); Joint replacement; Knee surgery (Bilateral, 12/01/2015); and Colonoscopy (11/04/2014)  ,  _______________________________________________________________________  Family History:  family history includes Alcohol abuse in his family;  Arthritis in his mother; Breast cancer in his mother; Colon cancer in his mother; Diabetes in his paternal grandfather; Hypertension in his father; No Known Problems in his brother and sister; Prostate cancer in his father ,  _______________________________________________________________________  Social History:   reports that he has been smoking cigars  He has smoked for the past 40 00 years  He has never used smokeless tobacco  He reports current alcohol use of about 3 0 - 4 0 standard drinks of alcohol per week  He reports previous drug use ,  _______________________________________________________________________  Allergies:  has No Known Allergies     _______________________________________________________________________  Current Outpatient Medications   Medication Sig Dispense Refill    ALPRAZolam (XANAX) 0 25 mg tablet Take 1 tablet (0 25 mg total) by mouth daily at bedtime as needed for anxiety 30 tablet 0    cetirizine (ZyrTEC) 10 mg tablet Take 10 mg by mouth daily      hydrochlorothiazide (HYDRODIURIL) 25 mg tablet TAKE ONE TABLET BY MOUTH EVERY DAY 30 tablet 6    metFORMIN (GLUCOPHAGE-XR) 500 mg 24 hr tablet TAKE 1 TABLET BY MOUTH DAILY WITH DINNER 30 tablet 2    metoprolol succinate (TOPROL-XL) 100 mg 24 hr tablet Take 1 tablet (100 mg total) by mouth daily 90 tablet 2     No current facility-administered medications for this visit      _______________________________________________________________________  Review of Systems   Respiratory: Negative for cough, chest tightness and shortness of breath  Cardiovascular: Negative for chest pain, palpitations and leg swelling  Neurological: Negative for dizziness, weakness, light-headedness and headaches  Psychiatric/Behavioral: Negative for dysphoric mood  The patient is not nervous/anxious  Objective:  Vitals:    07/11/22 0930   BP: 129/88   BP Location: Left arm   Patient Position: Sitting   Cuff Size: Standard   Pulse: 72   Resp: 20   Temp: 98 9 °F (37 2 °C)   TempSrc: Temporal   SpO2: 94%   Weight: 128 kg (282 lb)   Height: 5' 8" (1 727 m)     Body mass index is 42 88 kg/m²  Physical Exam  Constitutional:       General: He is not in acute distress  Appearance: Normal appearance  He is well-developed  He is not ill-appearing  Eyes:      Extraocular Movements: Extraocular movements intact  Pupils: Pupils are equal, round, and reactive to light  Cardiovascular:      Rate and Rhythm: Normal rate and regular rhythm  Pulses: Normal pulses  Heart sounds: Normal heart sounds  No murmur heard  Pulmonary:      Effort: Pulmonary effort is normal       Breath sounds: Normal breath sounds  Musculoskeletal:      Cervical back: Normal range of motion  Neurological:      General: No focal deficit present  Mental Status: He is alert and oriented to person, place, and time  Mental status is at baseline  Psychiatric:         Mood and Affect: Mood normal        BMI Counseling: Body mass index is 42 88 kg/m²  The BMI is above normal  Nutrition recommendations include 3-5 servings of fruits/vegetables daily, reducing fast food intake, consuming healthier snacks, decreasing soda and/or juice intake, moderation in carbohydrate intake and increasing intake of lean protein  Exercise recommendations include exercising 3-5 times per week and strength training exercises  (0) independent

## 2022-07-12 ENCOUNTER — HOSPITAL ENCOUNTER (OUTPATIENT)
Dept: GASTROENTEROLOGY | Facility: AMBULATORY SURGERY CENTER | Age: 62
Discharge: HOME | End: 2022-07-12
Payer: COMMERCIAL

## 2022-07-12 ENCOUNTER — TELEPHONE (OUTPATIENT)
Dept: GASTROENTEROLOGY | Facility: CLINIC | Age: 62
End: 2022-07-12

## 2022-07-12 ENCOUNTER — ANESTHESIA EVENT (OUTPATIENT)
Dept: GASTROENTEROLOGY | Facility: AMBULATORY SURGERY CENTER | Age: 62
End: 2022-07-12

## 2022-07-12 ENCOUNTER — ANESTHESIA (OUTPATIENT)
Dept: GASTROENTEROLOGY | Facility: AMBULATORY SURGERY CENTER | Age: 62
End: 2022-07-12

## 2022-07-12 VITALS
RESPIRATION RATE: 18 BRPM | SYSTOLIC BLOOD PRESSURE: 110 MMHG | TEMPERATURE: 97.4 F | BODY MASS INDEX: 41.22 KG/M2 | HEART RATE: 55 BPM | OXYGEN SATURATION: 97 % | HEIGHT: 68 IN | WEIGHT: 272 LBS | DIASTOLIC BLOOD PRESSURE: 58 MMHG

## 2022-07-12 DIAGNOSIS — K22.70 BARRETT'S ESOPHAGUS WITHOUT DYSPLASIA: ICD-10-CM

## 2022-07-12 DIAGNOSIS — Z86.010 HX OF COLONIC POLYPS: ICD-10-CM

## 2022-07-12 DIAGNOSIS — K21.9 GASTROESOPHAGEAL REFLUX DISEASE WITHOUT ESOPHAGITIS: ICD-10-CM

## 2022-07-12 DIAGNOSIS — K20.90 ESOPHAGITIS: Primary | ICD-10-CM

## 2022-07-12 PROBLEM — Z99.89 OBSTRUCTIVE SLEEP APNEA TREATED WITH CONTINUOUS POSITIVE AIRWAY PRESSURE (CPAP): Status: ACTIVE | Noted: 2022-07-12

## 2022-07-12 PROBLEM — G47.33 OBSTRUCTIVE SLEEP APNEA TREATED WITH CONTINUOUS POSITIVE AIRWAY PRESSURE (CPAP): Status: ACTIVE | Noted: 2022-07-12

## 2022-07-12 PROCEDURE — 45380 COLONOSCOPY AND BIOPSY: CPT | Performed by: INTERNAL MEDICINE

## 2022-07-12 PROCEDURE — 88342 IMHCHEM/IMCYTCHM 1ST ANTB: CPT | Performed by: PATHOLOGY

## 2022-07-12 PROCEDURE — 88341 IMHCHEM/IMCYTCHM EA ADD ANTB: CPT | Performed by: PATHOLOGY

## 2022-07-12 PROCEDURE — 43239 EGD BIOPSY SINGLE/MULTIPLE: CPT | Performed by: INTERNAL MEDICINE

## 2022-07-12 PROCEDURE — 88305 TISSUE EXAM BY PATHOLOGIST: CPT | Performed by: PATHOLOGY

## 2022-07-12 PROCEDURE — 88312 SPECIAL STAINS GROUP 1: CPT | Performed by: PATHOLOGY

## 2022-07-12 RX ORDER — OMEPRAZOLE 40 MG/1
40 CAPSULE, DELAYED RELEASE ORAL DAILY
Qty: 2 CAPSULE | Refills: 1 | Status: SHIPPED | OUTPATIENT
Start: 2022-07-12 | End: 2022-07-13 | Stop reason: SDUPTHER

## 2022-07-12 RX ORDER — LIDOCAINE HYDROCHLORIDE 10 MG/ML
INJECTION, SOLUTION EPIDURAL; INFILTRATION; INTRACAUDAL; PERINEURAL AS NEEDED
Status: DISCONTINUED | OUTPATIENT
Start: 2022-07-12 | End: 2022-07-12

## 2022-07-12 RX ORDER — PROPOFOL 10 MG/ML
INJECTION, EMULSION INTRAVENOUS AS NEEDED
Status: DISCONTINUED | OUTPATIENT
Start: 2022-07-12 | End: 2022-07-12

## 2022-07-12 RX ORDER — SODIUM CHLORIDE, SODIUM LACTATE, POTASSIUM CHLORIDE, CALCIUM CHLORIDE 600; 310; 30; 20 MG/100ML; MG/100ML; MG/100ML; MG/100ML
20 INJECTION, SOLUTION INTRAVENOUS CONTINUOUS
Status: DISCONTINUED | OUTPATIENT
Start: 2022-07-12 | End: 2022-07-16 | Stop reason: HOSPADM

## 2022-07-12 RX ORDER — FLUTICASONE PROPIONATE 50 MCG
1 SPRAY, SUSPENSION (ML) NASAL DAILY
COMMUNITY

## 2022-07-12 RX ORDER — SODIUM CHLORIDE 9 MG/ML
INJECTION, SOLUTION INTRAVENOUS CONTINUOUS PRN
Status: DISCONTINUED | OUTPATIENT
Start: 2022-07-12 | End: 2022-07-12

## 2022-07-12 RX ADMIN — PROPOFOL 50 MG: 10 INJECTION, EMULSION INTRAVENOUS at 09:15

## 2022-07-12 RX ADMIN — LIDOCAINE HYDROCHLORIDE 100 MG: 10 INJECTION, SOLUTION EPIDURAL; INFILTRATION; INTRACAUDAL; PERINEURAL at 09:02

## 2022-07-12 RX ADMIN — PROPOFOL 50 MG: 10 INJECTION, EMULSION INTRAVENOUS at 09:05

## 2022-07-12 RX ADMIN — PROPOFOL 50 MG: 10 INJECTION, EMULSION INTRAVENOUS at 09:19

## 2022-07-12 RX ADMIN — PROPOFOL 50 MG: 10 INJECTION, EMULSION INTRAVENOUS at 09:27

## 2022-07-12 RX ADMIN — PROPOFOL 30 MG: 10 INJECTION, EMULSION INTRAVENOUS at 09:22

## 2022-07-12 RX ADMIN — PROPOFOL 150 MG: 10 INJECTION, EMULSION INTRAVENOUS at 09:02

## 2022-07-12 RX ADMIN — SODIUM CHLORIDE: 9 INJECTION, SOLUTION INTRAVENOUS at 08:27

## 2022-07-12 RX ADMIN — PROPOFOL 40 MG: 10 INJECTION, EMULSION INTRAVENOUS at 09:29

## 2022-07-12 RX ADMIN — PROPOFOL 50 MG: 10 INJECTION, EMULSION INTRAVENOUS at 09:10

## 2022-07-12 RX ADMIN — PROPOFOL 30 MG: 10 INJECTION, EMULSION INTRAVENOUS at 09:36

## 2022-07-12 NOTE — H&P
History and Physical -  [facility]  Kg Alvarenga 63 y o  male MRN: [id number]                  HPI: Kg Alvarenga is a 63y o  year old male who presents for EGD and colonoscopy for history of Barretts esophagus, gastroesophageal reflux and history of adenomatous colon polyps  Last EGD and colonoscopy 2017      REVIEW OF SYSTEMS: Per the HPI, and otherwise unremarkable  Historical Information   Past Medical History:   Diagnosis Date    Arthritis     Colon polyp     Diabetes mellitus ([address] )     type 2    High blood pressure     Hypertension     Obesity     LISA (obstructive sleep apnea)     Sleep disorder     lisa     Past Surgical History:   Procedure Laterality Date    COLONOSCOPY  11/04/2014    CORRECTION HAMMER TOE Right     great toe    JOINT REPLACEMENT Bilateral     knees    KNEE SURGERY Bilateral 12/01/2015    done at same time    [address] Right      Social History   Social History     Substance and Sexual Activity   Alcohol Use Yes    Alcohol/week: 3 0 - 4 0 standard drinks    Types: 3 - 4 Standard drinks or equivalent per week    Comment: 2 per day     Social History     Substance and Sexual Activity   Drug Use Not Currently    Comment:    PMH marijuana daily use x 40y, stopped 2y ago because job testing  - As per romelia      Social History     Tobacco Use   Smoking Status Current Some Day Smoker    Years: 40 00    Types: Cigars   Smokeless Tobacco Never Used   Tobacco Comment    cigar use, 2-3 a week - As per romelia     Family History   Problem Relation Age of Onset    Colon cancer Mother     Breast cancer Mother     Arthritis Mother     Hypertension Father     Prostate cancer Father     Diabetes Paternal Grandfather     Alcohol abuse Family     No Known Problems Sister     No Known Problems Brother        Meds/Allergies       Current Outpatient Medications:     ALPRAZolam (XANAX) 0 25 mg tablet    AMINO ACIDS PO    cetirizine (ZyrTEC) 10 mg tablet    fluticasone (FLONASE) 50 mcg/act nasal spray    hydrochlorothiazide (HYDRODIURIL) 25 mg tablet    metFORMIN (GLUCOPHAGE-XR) 500 mg 24 hr tablet    metoprolol succinate (TOPROL-XL) 100 mg 24 hr tablet    Multiple Vitamin (MULTIVITAMIN PO)  No current facility-administered medications for this encounter  Facility-Administered Medications Ordered in Other Encounters:     sodium chloride 0 9 % infusion, , Intravenous, Continuous PRN, New Bag at 07/12/22 0827    No Known Allergies    Objective     /76   Pulse 60   Temp (!) 97 4 °F (36 3 °C) (Temporal)   Resp 18   Ht 5' 8" (1 727 m)   Wt 123 kg (272 lb)   SpO2 95%   BMI 41 36 kg/m²       PHYSICAL EXAM    Gen: NAD  Head: NCAT  CV: RRR  CHEST: Clear  ABD: soft, NT/ND  EXT: no edema      ASSESSMENT/PLAN:  This is a 63y o  year old male here for EGD and colonoscopy, and he is stable and optimized for his procedure

## 2022-07-12 NOTE — ANESTHESIA POSTPROCEDURE EVALUATION
Post-Op Assessment Note    CV Status:  Stable  Pain Score: 0    Pain management: adequate     Mental Status:  Alert and awake   Hydration Status:  Euvolemic   PONV Controlled:  Controlled   Airway Patency:  Patent      Post Op Vitals Reviewed: Yes      Staff: CRNA         No complications documented      BP   115/74   Temp     Pulse 66   Resp   22   SpO2   96

## 2022-07-12 NOTE — TELEPHONE ENCOUNTER
----- Message from Toño Frost RN sent at 7/12/2022  2:10 PM EDT -----  Regarding: Omeprazole order  Pt's wife called to let us know that you dispensed 2 tablets of Omeprazole for this pt  Can you please take care of this? Thank you  Left message with wife    BE H

## 2022-07-12 NOTE — ANESTHESIA PREPROCEDURE EVALUATION
Procedure:  COLONOSCOPY  EGD    Relevant Problems   CARDIO   (+) Essential hypertension      ENDO   (+) Type 2 diabetes mellitus without complication, without long-term current use of insulin (HCC)      NEURO/PSYCH   (+) Generalized anxiety disorder      PULMONARY   (+) Obstructive sleep apnea treated with continuous positive airway pressure (CPAP)      Other   (+) Class 3 severe obesity due to excess calories with serious comorbidity and body mass index (BMI) of 40 0 to 44 9 in [facility])   (+) Dyslipidemia      Body mass index is 41 36 kg/m²  Physical Exam    Airway    Mallampati score: I  TM Distance: >3 FB  Neck ROM: full     Dental   No notable dental hx     Cardiovascular      Pulmonary      Other Findings  Morbidly obese male      Anesthesia Plan  ASA Score- 3     Anesthesia Type- IV sedation with anesthesia with ASA Monitors  Additional Monitors:   Airway Plan:           Plan Factors-Exercise tolerance (METS): >4 METS  Chart reviewed  Patient summary reviewed  Patient is not a current smoker  Obstructive sleep apnea risk education given perioperatively  Induction- intravenous  Postoperative Plan-     Informed Consent- Anesthetic plan and risks discussed with patient  I personally reviewed this patient with the CRNA  Discussed and agreed on the Anesthesia Plan with the CRNA  Pasquale Mueller

## 2022-07-13 DIAGNOSIS — K22.70 BARRETT'S ESOPHAGUS WITHOUT DYSPLASIA: ICD-10-CM

## 2022-07-13 DIAGNOSIS — K20.90 ESOPHAGITIS: ICD-10-CM

## 2022-07-13 RX ORDER — OMEPRAZOLE 40 MG/1
40 CAPSULE, DELAYED RELEASE ORAL DAILY
Qty: 30 CAPSULE | Refills: 1 | Status: SHIPPED | OUTPATIENT
Start: 2022-07-13 | End: 2022-07-27 | Stop reason: SDUPTHER

## 2022-07-13 NOTE — TELEPHONE ENCOUNTER
Patients GI provider:  Dr Carrizales Lower     Number to return call: [phone]     Reason for call: Pt called stated prilosec only came in with 2 tablets   Would like this corrected and resend     Scheduled procedure/appointment date if applicable: Apt/procedure

## 2022-07-18 ENCOUNTER — PREP FOR PROCEDURE (OUTPATIENT)
Dept: GASTROENTEROLOGY | Facility: CLINIC | Age: 62
End: 2022-07-18

## 2022-07-18 ENCOUNTER — TELEPHONE (OUTPATIENT)
Dept: GASTROENTEROLOGY | Facility: CLINIC | Age: 62
End: 2022-07-18

## 2022-07-18 DIAGNOSIS — K22.10 ULCER OF ESOPHAGUS WITHOUT BLEEDING: Primary | ICD-10-CM

## 2022-07-18 NOTE — TELEPHONE ENCOUNTER
[address] [address] Assessment    Name: Arvin Mcqueen  YOB: 1960  Last Height: 5' 8" (1 727 m)  Last weight: 123 kg (272 lb)  BMI: 41 36 kg/m²  Procedure: EGD  Diagnosis: Esophageal Ulcer  Date of procedure: 9/28/22  Prep: na  Responsible : yes  Phone#: [phone]  Name completing form: Nadya Worthy  Date form completed: 07/18/22      If the patient answers yes to any of these questions, schedule in a hospital  Are you pregnant: No  Do you rely on a wheelchair for mobility: No  Have you been diagnosed with End Stage Renal Disease (ESRD): No  Do you need oxygen during the day: No  Have you had a heart attack or stroke within the past three months: No  Have you had a seizure within the past three months: No  Have you ever been informed by anesthesia that you have a difficult airway: No  Additional Questions  Have you had any cardiac testing or are under the care of a Cardiologist (see cardiac list): No  Cardiac list:   Do you have an implanted cardiac defibrillator: No (Comment:  This patient should be scheduled in the hospital)    Have any bleeding problems, such as anemia or hemophilia (If patient has H&H result below 8, schedule in hospital   H&H must be within 30 days of procedure): No    Had an organ transplant within the past 3 months: No    Do you have any present infections: No  Do you get short of breath when walking a few blocks: No  Have you been diagnosed with diabetes: No  Comments (provide cardiac provider information if applicable):

## 2022-07-27 DIAGNOSIS — K22.70 BARRETT'S ESOPHAGUS WITHOUT DYSPLASIA: ICD-10-CM

## 2022-07-27 DIAGNOSIS — K20.90 ESOPHAGITIS: ICD-10-CM

## 2022-07-27 RX ORDER — OMEPRAZOLE 40 MG/1
40 CAPSULE, DELAYED RELEASE ORAL DAILY
Qty: 30 CAPSULE | Refills: 1 | Status: SHIPPED | OUTPATIENT
Start: 2022-07-27 | End: 2022-09-22

## 2022-07-27 NOTE — TELEPHONE ENCOUNTER
Pt states he was told he needs a script to where it says he can take omeprazole 2x a day   Please confirm with

## 2022-07-29 NOTE — RESULT ENCOUNTER NOTE
Please inform patient that their biopsies were benign  Patient is repeat EGD in 2-3 months to ensure ulcer healing  And then a year after that due to Barretts esophagus on biopsies    Repeat colonoscopy 3 years

## 2022-08-25 NOTE — TELEPHONE ENCOUNTER
Called and lmom asking pt to call back to schedule FLIP  I will try calling again in 1 week  If he calls back before then, please get him scheduled  Thank you  [FreeTextEntry3] : AAOx3, pleasant, NAD, no visual lymphadenopathy\par hair, scalp, face, nose, eyelids, ears, lips, oropharynx, neck, chest, abdomen, back, right arm, left arm, nails, and hands examined with all normal findings,\par pertinent findings include:\par \par mild acne\par inflamed cystic papules on posterior neck\par hair loss

## 2022-08-30 ENCOUNTER — VBI (OUTPATIENT)
Dept: ADMINISTRATIVE | Facility: OTHER | Age: 62
End: 2022-08-30

## 2022-09-22 ENCOUNTER — TELEPHONE (OUTPATIENT)
Dept: GASTROENTEROLOGY | Facility: CLINIC | Age: 62
End: 2022-09-22

## 2022-09-22 DIAGNOSIS — K22.70 BARRETT'S ESOPHAGUS WITHOUT DYSPLASIA: ICD-10-CM

## 2022-09-22 DIAGNOSIS — K20.90 ESOPHAGITIS: ICD-10-CM

## 2022-09-22 RX ORDER — OMEPRAZOLE 40 MG/1
CAPSULE, DELAYED RELEASE ORAL
Qty: 30 CAPSULE | Refills: 1 | Status: SHIPPED | OUTPATIENT
Start: 2022-09-22

## 2022-09-22 NOTE — TELEPHONE ENCOUNTER
Patients GI provider:  Dr Ry Hernandez    Number to return call: [phone]    Reason for call: Pt calling to cancel and will call back to reschedule at a later date      Scheduled procedure/appointment date if applicable: Procedure 6/19/81

## 2022-09-22 NOTE — TELEPHONE ENCOUNTER
lmom confirming pt's egd scheduled on 9/28/22 at [facility] with Dr Octavia Meadows  Informed [facility] would be calling 1-2 days prior with arrival time  Informed pt would need to be npo after midnight and would need a  the day of the procedure due to being under sedation  I asked pt to please call back if has any questions or has not received instructions

## 2022-09-22 NOTE — TELEPHONE ENCOUNTER
Appt has been cancelled  If patient calls back please reschedule  I will follow up in 2 weeks to see if pt rescheduled

## 2022-10-06 NOTE — TELEPHONE ENCOUNTER
Called and spoke to pt whom informed that he does not wish to reschedule at this point and will call us when ready

## 2022-10-17 DIAGNOSIS — E11.9 TYPE 2 DIABETES MELLITUS WITHOUT COMPLICATION, WITHOUT LONG-TERM CURRENT USE OF INSULIN (HCC): ICD-10-CM

## 2022-10-17 RX ORDER — METFORMIN HYDROCHLORIDE 500 MG/1
TABLET, EXTENDED RELEASE ORAL
Qty: 30 TABLET | Refills: 2 | Status: SHIPPED | OUTPATIENT
Start: 2022-10-17

## 2022-11-11 ENCOUNTER — VBI (OUTPATIENT)
Dept: ADMINISTRATIVE | Facility: OTHER | Age: 62
End: 2022-11-11

## 2022-11-11 NOTE — TELEPHONE ENCOUNTER
11/11/22 7:54 AM     See documentation in the [employer] SmartForm       [place], [place] 0- result does not list diabetic eye exam type does not meet criteria for sumission

## 2022-11-28 DIAGNOSIS — K22.70 BARRETT'S ESOPHAGUS WITHOUT DYSPLASIA: ICD-10-CM

## 2022-11-28 DIAGNOSIS — K20.90 ESOPHAGITIS: ICD-10-CM

## 2022-11-28 RX ORDER — OMEPRAZOLE 40 MG/1
CAPSULE, DELAYED RELEASE ORAL
Qty: 30 CAPSULE | Refills: 1 | Status: SHIPPED | OUTPATIENT
Start: 2022-11-28

## 2023-01-11 DIAGNOSIS — I10 ESSENTIAL HYPERTENSION: ICD-10-CM

## 2023-01-12 RX ORDER — HYDROCHLOROTHIAZIDE 25 MG/1
TABLET ORAL
Qty: 30 TABLET | Refills: 6 | Status: SHIPPED | OUTPATIENT
Start: 2023-01-12 | End: 2023-01-13

## 2023-01-13 DIAGNOSIS — I10 ESSENTIAL HYPERTENSION: ICD-10-CM

## 2023-01-13 RX ORDER — HYDROCHLOROTHIAZIDE 25 MG/1
TABLET ORAL
Qty: 30 TABLET | Refills: 6 | Status: SHIPPED | OUTPATIENT
Start: 2023-01-13

## 2023-01-16 DIAGNOSIS — F41.1 GENERALIZED ANXIETY DISORDER: ICD-10-CM

## 2023-01-17 RX ORDER — ALPRAZOLAM 0.25 MG/1
0.25 TABLET ORAL
Qty: 30 TABLET | Refills: 0 | Status: SHIPPED | OUTPATIENT
Start: 2023-01-17

## 2023-01-22 DIAGNOSIS — E11.9 TYPE 2 DIABETES MELLITUS WITHOUT COMPLICATION, WITHOUT LONG-TERM CURRENT USE OF INSULIN (HCC): ICD-10-CM

## 2023-01-23 RX ORDER — METFORMIN HYDROCHLORIDE 500 MG/1
TABLET, EXTENDED RELEASE ORAL
Qty: 30 TABLET | Refills: 2 | Status: SHIPPED | OUTPATIENT
Start: 2023-01-23 | End: 2023-01-25

## 2023-01-25 DIAGNOSIS — K22.70 BARRETT'S ESOPHAGUS WITHOUT DYSPLASIA: ICD-10-CM

## 2023-01-25 DIAGNOSIS — K20.90 ESOPHAGITIS: ICD-10-CM

## 2023-01-25 DIAGNOSIS — E11.9 TYPE 2 DIABETES MELLITUS WITHOUT COMPLICATION, WITHOUT LONG-TERM CURRENT USE OF INSULIN (HCC): ICD-10-CM

## 2023-01-25 RX ORDER — METFORMIN HYDROCHLORIDE 500 MG/1
TABLET, EXTENDED RELEASE ORAL
Qty: 30 TABLET | Refills: 2 | Status: SHIPPED | OUTPATIENT
Start: 2023-01-25

## 2023-01-25 RX ORDER — OMEPRAZOLE 40 MG/1
CAPSULE, DELAYED RELEASE ORAL
Qty: 30 CAPSULE | Refills: 1 | Status: SHIPPED | OUTPATIENT
Start: 2023-01-25

## 2023-01-30 ENCOUNTER — OFFICE VISIT (OUTPATIENT)
Dept: FAMILY MEDICINE CLINIC | Facility: CLINIC | Age: 63
End: 2023-01-30

## 2023-01-30 VITALS
OXYGEN SATURATION: 97 % | DIASTOLIC BLOOD PRESSURE: 86 MMHG | HEART RATE: 68 BPM | SYSTOLIC BLOOD PRESSURE: 128 MMHG | BODY MASS INDEX: 41.68 KG/M2 | WEIGHT: 275 LBS | RESPIRATION RATE: 16 BRPM | HEIGHT: 68 IN | TEMPERATURE: 97 F

## 2023-01-30 DIAGNOSIS — E11.9 TYPE 2 DIABETES MELLITUS WITHOUT COMPLICATION, WITHOUT LONG-TERM CURRENT USE OF INSULIN (HCC): Primary | ICD-10-CM

## 2023-01-30 DIAGNOSIS — K22.70 BARRETT'S ESOPHAGUS WITHOUT DYSPLASIA: ICD-10-CM

## 2023-01-30 DIAGNOSIS — M79.671 RIGHT FOOT PAIN: ICD-10-CM

## 2023-01-30 DIAGNOSIS — S20.211A CONTUSION OF RIGHT FRONT WALL OF THORAX, INITIAL ENCOUNTER: ICD-10-CM

## 2023-01-30 DIAGNOSIS — I10 ESSENTIAL HYPERTENSION: ICD-10-CM

## 2023-01-30 DIAGNOSIS — G47.33 OBSTRUCTIVE SLEEP APNEA TREATED WITH CONTINUOUS POSITIVE AIRWAY PRESSURE (CPAP): ICD-10-CM

## 2023-01-30 DIAGNOSIS — E66.01 CLASS 3 SEVERE OBESITY DUE TO EXCESS CALORIES WITH SERIOUS COMORBIDITY AND BODY MASS INDEX (BMI) OF 40.0 TO 44.9 IN ADULT (HCC): ICD-10-CM

## 2023-01-30 DIAGNOSIS — K21.00 GASTROESOPHAGEAL REFLUX DISEASE WITH ESOPHAGITIS WITHOUT HEMORRHAGE: ICD-10-CM

## 2023-01-30 DIAGNOSIS — Z99.89 OBSTRUCTIVE SLEEP APNEA TREATED WITH CONTINUOUS POSITIVE AIRWAY PRESSURE (CPAP): ICD-10-CM

## 2023-01-30 DIAGNOSIS — E78.5 DYSLIPIDEMIA: ICD-10-CM

## 2023-01-30 PROBLEM — T14.8XXA CONTUSION: Status: ACTIVE | Noted: 2023-01-30

## 2023-01-30 LAB — SL AMB POCT HEMOGLOBIN AIC: 6.6 (ref ?–6.5)

## 2023-01-30 NOTE — ASSESSMENT & PLAN NOTE
Lab Results   Component Value Date    [facility]1C 6 5 (H) 06/10/2022   check labs   now 6 6   Increase exericise

## 2023-01-30 NOTE — PROGRESS NOTES
Name: Linnette Nance      : 1960      MRN: [id number]  Encounter Provider: Alma Rosa Lopez MD  Encounter Date: 2023   Encounter department: [address] [place] [address]  MEDICINE    Assessment & Plan     1  Type 2 diabetes mellitus without complication, without long-term current use of insulin [facility])  Assessment & Plan:    Lab Results   Component Value Date    HGBA1C 6 5 (H) 06/10/2022   check labs   now 6 6   Increase exericise    Orders:  -     POCT hemoglobin A1c  -     Comprehensive metabolic panel; Future; Expected date: 2023  -     Lipid panel; Future; Expected date: 2023  -     Microalbumin / creatinine urine ratio; Future; Expected date: 2023    2  Essential hypertension  Assessment & Plan:  Well controlled on current therapy continue with current medications and will reassess next visit        3  Obstructive sleep apnea treated with continuous positive airway pressure (CPAP)  Assessment & Plan:  doing well on CPAP      4  Contusion of right front wall of thorax, initial encounter  Assessment & Plan:  Right thorax corresponds to area of discomfort on palpation      5  Class 3 severe obesity due to excess calories with serious comorbidity and body mass index (BMI) of 40 0 to 44 9 in adult [facility])  Assessment & Plan:  Pt now [employer]      6  Dyslipidemia  Assessment & Plan:  Check  labs      7  Mancia's esophagus without dysplasia  Assessment & Plan:  Pt to make appt with dr Steve Hubbard      8  Right foot pain  Assessment & Plan:  Nl exam may have strained it only 2 days       9   Gastroesophageal reflux disease with esophagitis without hemorrhage  Assessment & Plan:  Pt states GI told him to take omeprazole in am and nexium at night              Subjective      HPI pt here for  Interval visit multiple problems pt has  Had pain in right foot instep for 2-3 days  And pain in right side for 2 days some pain to touch does do exercise   Review of Systems Constitutional: Negative for appetite change, chills, fatigue and fever  Respiratory: Negative for cough, chest tightness and shortness of breath  Cardiovascular: Negative for chest pain, palpitations and leg swelling  Gastrointestinal: Negative for abdominal pain, constipation, diarrhea, nausea and vomiting  Genitourinary: Negative for difficulty urinating and frequency  Musculoskeletal: Negative for arthralgias, back pain and neck pain  Pain right thorax ; right instep  2 days    Skin: Negative for rash  Neurological: Negative for dizziness, weakness, light-headedness, numbness and headaches  Hematological: Does not bruise/bleed easily  Psychiatric/Behavioral: Negative for dysphoric mood and sleep disturbance  The patient is not nervous/anxious  Current Outpatient Medications on File Prior to Visit   Medication Sig   • ALPRAZolam (XANAX) 0 25 mg tablet Take 1 tablet (0 25 mg total) by mouth daily at bedtime as needed for anxiety   • AMINO ACIDS PO Take by mouth in the morning   • cetirizine (ZyrTEC) 10 mg tablet Take 10 mg by mouth daily   • esomeprazole (NexIUM) 20 mg capsule Take 20 mg by mouth every morning before breakfast   • fluticasone (FLONASE) 50 mcg/act nasal spray 1 spray into each nostril daily   • hydrochlorothiazide (HYDRODIURIL) 25 mg tablet TAKE ONE TABLET BY MOUTH EVERY DAY   • metFORMIN (GLUCOPHAGE-XR) 500 mg 24 hr tablet TAKE ONE TABLET BY MOUTH EVERY DAY WITH DINNER   • metoprolol succinate (TOPROL-XL) 100 mg 24 hr tablet Take 1 tablet (100 mg total) by mouth daily   • Multiple Vitamin (MULTIVITAMIN PO) Take by mouth in the morning   • omeprazole (PriLOSEC) 40 MG capsule TAKE ONE CAPSULE BY MOUTH EVERY DAY       Objective     /86   Pulse 68   Temp (!) 97 °F (36 1 °C) (Tympanic)   Resp 16   Ht 5' 8" (1 727 m)   Wt 125 kg (275 lb)   SpO2 97%   BMI 41 81 kg/m²     Physical Exam  Vitals reviewed     Constitutional:       General: He is not in acute distress  Appearance: Normal appearance  He is well-developed  He is obese  He is not ill-appearing  HENT:      Mouth/Throat:      Mouth: Mucous membranes are moist    Eyes:      Extraocular Movements: Extraocular movements intact  Conjunctiva/sclera: Conjunctivae normal       Pupils: Pupils are equal, round, and reactive to light  Neck:      Thyroid: No thyromegaly  Vascular: No carotid bruit  Cardiovascular:      Rate and Rhythm: Normal rate and regular rhythm  Pulses: Normal pulses  no weak pulses          Dorsalis pedis pulses are 2+ on the right side and 2+ on the left side  Heart sounds: Normal heart sounds  No murmur heard  Pulmonary:      Effort: Pulmonary effort is normal       Breath sounds: Normal breath sounds  Chest:      Chest wall: No tenderness  Abdominal:      General: Bowel sounds are normal  There is no distension  Palpations: Abdomen is soft  Tenderness: There is no abdominal tenderness  Musculoskeletal:      Cervical back: Normal range of motion and neck supple  Comments: Tender right lateral thorax  Spot at bruise; nl foot exam no tenderness   Feet:      Right foot:      Skin integrity: Dry skin present  No ulcer, skin breakdown, erythema, warmth or callus  Left foot:      Skin integrity: Dry skin present  No ulcer, skin breakdown, erythema, warmth or callus  Lymphadenopathy:      Cervical: No cervical adenopathy  Skin:     General: Skin is warm and dry  Comments: Bruise right lateral thorax area tender to touch    Neurological:      General: No focal deficit present  Mental Status: He is alert and oriented to person, place, and time  Mental status is at baseline  Cranial Nerves: No cranial nerve deficit  Psychiatric:         Mood and Affect: Mood normal          Behavior: Behavior normal      BMI Counseling: Body mass index is 41 81 kg/m²   The BMI is above normal  Nutrition recommendations include 3-5 servings of fruits/vegetables daily, reducing fast food intake, consuming healthier snacks, decreasing soda and/or juice intake, moderation in carbohydrate intake, increasing intake of lean protein and reducing intake of saturated fat and trans fat  Exercise recommendations include exercising 3-5 times per week and strength training exercises  Patient's shoes and socks removed  Right Foot/Ankle   Right Foot Inspection  Skin Exam: skin normal, skin intact and dry skin  No warmth, no callus, no erythema, no maceration, no abnormal color, no pre-ulcer, no ulcer and no callus  Toe Exam: No swelling, no tenderness, erythema and  no right toe deformity    Sensory   Vibration: intact  Proprioception: intact  Monofilament testing: intact    Vascular  The right DP pulse is 2+  Right Toe  - Comprehensive Exam  Ecchymosis: none  Swelling: none   Tenderness: none         Left Foot/Ankle  Left Foot Inspection  Skin Exam: skin normal, skin intact and dry skin  No warmth, no erythema, no maceration, normal color, no pre-ulcer, no ulcer and no callus  Sensory   Vibration: intact  Proprioception: intact  Monofilament testing: intact    Vascular  The left DP pulse is 2+       Left Toe  - Comprehensive Exam  Ecchymosis: none  Swelling: none   Tenderness: none           Assign Risk Category  No deformity present  No loss of protective sensation  No weak pulses  Risk: 0      Bernard Merritt MD

## 2023-01-31 ENCOUNTER — APPOINTMENT (OUTPATIENT)
Dept: LAB | Facility: CLINIC | Age: 63
End: 2023-01-31

## 2023-01-31 DIAGNOSIS — E11.9 TYPE 2 DIABETES MELLITUS WITHOUT COMPLICATION, WITHOUT LONG-TERM CURRENT USE OF INSULIN (HCC): ICD-10-CM

## 2023-01-31 LAB
ALBUMIN SERPL BCP-MCNC: 3.6 G/DL (ref 3.5–5)
ALP SERPL-CCNC: 102 U/L (ref 46–116)
ALT SERPL W P-5'-P-CCNC: 38 U/L (ref 12–78)
ANION GAP SERPL CALCULATED.3IONS-SCNC: 6 MMOL/L (ref 4–13)
AST SERPL W P-5'-P-CCNC: 29 U/L (ref 5–45)
BILIRUB SERPL-MCNC: 0.61 MG/DL (ref 0.2–1)
BUN SERPL-MCNC: 14 MG/DL (ref 5–25)
CALCIUM SERPL-MCNC: 9.5 MG/DL (ref 8.3–10.1)
CHLORIDE SERPL-SCNC: 103 MMOL/L (ref 96–108)
CHOLEST SERPL-MCNC: 171 MG/DL
CO2 SERPL-SCNC: 30 MMOL/L (ref 21–32)
CREAT SERPL-MCNC: 0.84 MG/DL (ref 0.6–1.3)
CREAT UR-MCNC: 158 MG/DL
GFR SERPL CREATININE-BSD FRML MDRD: 95 ML/MIN/1.73SQ M
GLUCOSE P FAST SERPL-MCNC: 128 MG/DL (ref 65–99)
HDLC SERPL-MCNC: 34 MG/DL
LDLC SERPL CALC-MCNC: 103 MG/DL (ref 0–100)
MICROALBUMIN UR-MCNC: <5 MG/L (ref 0–20)
MICROALBUMIN/CREAT 24H UR: <3 MG/G CREATININE (ref 0–30)
NONHDLC SERPL-MCNC: 137 MG/DL
POTASSIUM SERPL-SCNC: 4 MMOL/L (ref 3.5–5.3)
PROT SERPL-MCNC: 7 G/DL (ref 6.4–8.4)
SODIUM SERPL-SCNC: 139 MMOL/L (ref 135–147)
TRIGL SERPL-MCNC: 168 MG/DL

## 2023-03-10 DIAGNOSIS — F41.1 GENERALIZED ANXIETY DISORDER: ICD-10-CM

## 2023-03-10 RX ORDER — ALPRAZOLAM 0.25 MG/1
TABLET ORAL
Qty: 30 TABLET | Refills: 0 | Status: SHIPPED | OUTPATIENT
Start: 2023-03-10

## 2023-03-20 DIAGNOSIS — I10 ESSENTIAL HYPERTENSION: ICD-10-CM

## 2023-03-20 RX ORDER — METOPROLOL SUCCINATE 100 MG/1
100 TABLET, EXTENDED RELEASE ORAL DAILY
Qty: 90 TABLET | Refills: 2 | Status: SHIPPED | OUTPATIENT
Start: 2023-03-20

## 2023-04-05 DIAGNOSIS — K20.90 ESOPHAGITIS: ICD-10-CM

## 2023-04-05 DIAGNOSIS — K22.70 BARRETT'S ESOPHAGUS WITHOUT DYSPLASIA: ICD-10-CM

## 2023-04-05 RX ORDER — OMEPRAZOLE 40 MG/1
CAPSULE, DELAYED RELEASE ORAL
Qty: 30 CAPSULE | Refills: 1 | Status: SHIPPED | OUTPATIENT
Start: 2023-04-05

## 2023-06-05 ENCOUNTER — OFFICE VISIT (OUTPATIENT)
Dept: FAMILY MEDICINE CLINIC | Facility: CLINIC | Age: 63
End: 2023-06-05
Payer: COMMERCIAL

## 2023-06-05 VITALS
SYSTOLIC BLOOD PRESSURE: 132 MMHG | RESPIRATION RATE: 16 BRPM | HEART RATE: 62 BPM | DIASTOLIC BLOOD PRESSURE: 88 MMHG | BODY MASS INDEX: 40.92 KG/M2 | HEIGHT: 68 IN | OXYGEN SATURATION: 95 % | WEIGHT: 270 LBS | TEMPERATURE: 98.8 F

## 2023-06-05 DIAGNOSIS — E78.5 DYSLIPIDEMIA: ICD-10-CM

## 2023-06-05 DIAGNOSIS — E11.9 TYPE 2 DIABETES MELLITUS WITHOUT COMPLICATION, WITHOUT LONG-TERM CURRENT USE OF INSULIN (HCC): ICD-10-CM

## 2023-06-05 DIAGNOSIS — K21.00 GASTROESOPHAGEAL REFLUX DISEASE WITH ESOPHAGITIS WITHOUT HEMORRHAGE: ICD-10-CM

## 2023-06-05 DIAGNOSIS — Z99.89 OBSTRUCTIVE SLEEP APNEA TREATED WITH CONTINUOUS POSITIVE AIRWAY PRESSURE (CPAP): ICD-10-CM

## 2023-06-05 DIAGNOSIS — Z12.5 PROSTATE CANCER SCREENING: ICD-10-CM

## 2023-06-05 DIAGNOSIS — G47.33 OBSTRUCTIVE SLEEP APNEA TREATED WITH CONTINUOUS POSITIVE AIRWAY PRESSURE (CPAP): ICD-10-CM

## 2023-06-05 DIAGNOSIS — K22.70 BARRETT'S ESOPHAGUS WITHOUT DYSPLASIA: Primary | ICD-10-CM

## 2023-06-05 DIAGNOSIS — F41.1 GENERALIZED ANXIETY DISORDER: ICD-10-CM

## 2023-06-05 DIAGNOSIS — E66.01 CLASS 3 SEVERE OBESITY DUE TO EXCESS CALORIES WITH SERIOUS COMORBIDITY AND BODY MASS INDEX (BMI) OF 40.0 TO 44.9 IN ADULT (HCC): ICD-10-CM

## 2023-06-05 DIAGNOSIS — I10 ESSENTIAL HYPERTENSION: ICD-10-CM

## 2023-06-05 DIAGNOSIS — M79.671 RIGHT FOOT PAIN: ICD-10-CM

## 2023-06-05 LAB
CREAT UR-MCNC: 84.6 MG/DL
MICROALBUMIN UR-MCNC: <5 MG/L (ref 0–20)
MICROALBUMIN/CREAT 24H UR: <6 MG/G CREATININE (ref 0–30)
SL AMB POCT HEMOGLOBIN AIC: 6.2 (ref ?–6.5)

## 2023-06-05 PROCEDURE — 83036 HEMOGLOBIN GLYCOSYLATED A1C: CPT | Performed by: FAMILY MEDICINE

## 2023-06-05 PROCEDURE — 82570 ASSAY OF URINE CREATININE: CPT | Performed by: FAMILY MEDICINE

## 2023-06-05 PROCEDURE — 82043 UR ALBUMIN QUANTITATIVE: CPT | Performed by: FAMILY MEDICINE

## 2023-06-05 PROCEDURE — 99214 OFFICE O/P EST MOD 30 MIN: CPT | Performed by: FAMILY MEDICINE

## 2023-06-05 RX ORDER — MELOXICAM 15 MG/1
15 TABLET ORAL DAILY
COMMUNITY
Start: 2023-03-18

## 2023-06-05 RX ORDER — ALPRAZOLAM 0.25 MG/1
0.25 TABLET ORAL
Qty: 30 TABLET | Refills: 0 | Status: SHIPPED | OUTPATIENT
Start: 2023-06-05

## 2023-06-05 NOTE — ASSESSMENT & PLAN NOTE
Discussion on diet and exercise guidelines for weight loss and  health reviewed with pt Principal Discharge DX:	Anemia

## 2023-06-05 NOTE — PROGRESS NOTES
Assessment/Plan:         Problem List Items Addressed This Visit        Digestive    Mancia's esophagus without dysplasia - Primary     Ok on omeprazole  Needs to see dr Soren Welch GI         Gastroesophageal reflux disease with esophagitis     On omeprazole             Endocrine    Type 2 diabetes mellitus without complication, without long-term current use of insulin ([facility] )       Lab Results   Component Value Date    HGBA1C 6 6 (A) 01/30/2023   due for labs          Relevant Orders    POCT hemoglobin A1c (Completed)    Albumin / creatinine urine ratio       Respiratory    Obstructive sleep apnea treated with continuous positive airway pressure (CPAP)     Ok on cpap            Cardiovascular and Mediastinum    Essential hypertension     Well controlled on current therapy continue with current medications and will reassess next visit           Relevant Orders    Comprehensive metabolic panel       Other    Dyslipidemia     Due for labs         Relevant Orders    Lipid panel    Class 3 severe obesity due to excess calories with serious comorbidity and body mass index (BMI) of 40 0 to 44 9 in [facility])     Discussion on diet and exercise guidelines for weight loss and  health reviewed with pt            Generalized anxiety disorder     Refill meds          Relevant Medications    ALPRAZolam (XANAX) 0 25 mg tablet    Right foot pain     Stress fracture followed by podiatry now better        Other Visit Diagnoses     Prostate cancer screening        Relevant Orders    PSA, Total Screen            Subjective:  Pt is here for interval visit and evaluation of multiple medical problems, review of medications, labs, Health Maintenance and any recent specialty consults       Patient ID: Brandy Rendon is a 66 y o  male      HPI    The following portions of the patient's history were reviewed and updated as appropriate:   Past Medical History:  He has a past medical history of Arthritis, Colon polyp, Diabetes mellitus ([facility] ), High blood pressure, Hypertension, Obesity, LISA (obstructive sleep apnea), and Sleep disorder  ,  _______________________________________________________________________  Medical Problems:  does not have any pertinent problems on file ,  _______________________________________________________________________  Past Surgical History:   has a past surgical history that includes Correction hammer toe (Right); Joint replacement (Bilateral); Knee surgery (Bilateral, 12/01/2015); Colonoscopy (11/04/2014); and Rotator cuff repair (Right)  ,  _______________________________________________________________________  Family History:  family history includes Alcohol abuse in his family; Arthritis in his mother; Breast cancer in his mother; Colon cancer in his mother; Diabetes in his paternal grandfather; Hypertension in his father; No Known Problems in his brother and sister; Prostate cancer in his father ,  _______________________________________________________________________  Social History:   reports that he has been smoking cigars  He has never used smokeless tobacco  He reports current alcohol use of about 3 0 - 4 0 standard drinks of alcohol per week  He reports that he does not currently use drugs  ,  _______________________________________________________________________  Allergies:  has No Known Allergies     _______________________________________________________________________  Current Outpatient Medications   Medication Sig Dispense Refill   • ALPRAZolam (XANAX) 0 25 mg tablet Take 1 tablet (0 25 mg total) by mouth daily at bedtime as needed for anxiety 30 tablet 0   • AMINO ACIDS PO Take by mouth in the morning     • cetirizine (ZyrTEC) 10 mg tablet Take 10 mg by mouth daily     • fluticasone (FLONASE) 50 mcg/act nasal spray 1 spray into each nostril daily     • hydrochlorothiazide (HYDRODIURIL) 25 mg tablet TAKE ONE TABLET BY MOUTH EVERY DAY 30 tablet 6   • metFORMIN (GLUCOPHAGE-XR) 500 mg 24 hr tablet TAKE ONE "TABLET BY MOUTH EVERY DAY WITH DINNER 30 tablet 2   • metoprolol succinate (TOPROL-XL) 100 mg 24 hr tablet Take 1 tablet (100 mg total) by mouth daily 90 tablet 2   • Multiple Vitamin (MULTIVITAMIN PO) Take by mouth in the morning     • omeprazole (PriLOSEC) 40 MG capsule TAKE ONE CAPSULE BY MOUTH EVERY DAY 30 capsule 1   • meloxicam (MOBIC) 15 mg tablet Take 15 mg by mouth daily With food (Patient not taking: Reported on 6/5/2023)       No current facility-administered medications for this visit      _______________________________________________________________________  Review of Systems   Constitutional: Negative for appetite change, chills, fatigue and fever  Respiratory: Negative for cough, chest tightness and shortness of breath  Cardiovascular: Negative for chest pain, palpitations and leg swelling  Gastrointestinal: Negative for abdominal pain, constipation, diarrhea, nausea and vomiting  Genitourinary: Negative for difficulty urinating and frequency  Musculoskeletal: Negative for arthralgias, back pain, gait problem and neck pain  Skin: Negative for rash  Neurological: Negative for dizziness, weakness, light-headedness, numbness and headaches  Hematological: Does not bruise/bleed easily  Psychiatric/Behavioral: Negative for dysphoric mood and sleep disturbance  The patient is not nervous/anxious  Objective:  Vitals:    06/05/23 0903 06/05/23 0925   BP: 124/100 132/88   BP Location: Left arm    Patient Position: Sitting    Cuff Size: Large    Pulse: 62    Resp: 16    Temp: 98 8 °F (37 1 °C)    TempSrc: Tympanic    SpO2: 95%    Weight: 122 kg (270 lb)    Height: 5' 8\" (1 727 m)      Body mass index is 41 05 kg/m²  Physical Exam  Vitals reviewed  Constitutional:       General: He is not in acute distress  Appearance: Normal appearance  He is well-developed  He is obese  He is not ill-appearing     HENT:      Mouth/Throat:      Mouth: Mucous membranes are moist    Eyes:     " Extraocular Movements: Extraocular movements intact  Conjunctiva/sclera: Conjunctivae normal       Pupils: Pupils are equal, round, and reactive to light  Neck:      Thyroid: No thyromegaly  Vascular: No carotid bruit  Cardiovascular:      Rate and Rhythm: Normal rate and regular rhythm  Pulses: Normal pulses  Heart sounds: Normal heart sounds  No murmur heard  Pulmonary:      Effort: Pulmonary effort is normal       Breath sounds: Normal breath sounds  Chest:      Chest wall: No tenderness  Abdominal:      General: Bowel sounds are normal  There is no distension  Palpations: Abdomen is soft  Tenderness: There is no abdominal tenderness  Musculoskeletal:      Cervical back: Normal range of motion and neck supple  Lymphadenopathy:      Cervical: No cervical adenopathy  Skin:     General: Skin is warm and dry  Neurological:      General: No focal deficit present  Mental Status: He is alert and oriented to person, place, and time  Mental status is at baseline  Cranial Nerves: No cranial nerve deficit     Psychiatric:         Mood and Affect: Mood normal          Behavior: Behavior normal

## 2023-06-08 DIAGNOSIS — K20.90 ESOPHAGITIS: ICD-10-CM

## 2023-06-08 DIAGNOSIS — K22.70 BARRETT'S ESOPHAGUS WITHOUT DYSPLASIA: ICD-10-CM

## 2023-06-08 RX ORDER — OMEPRAZOLE 40 MG/1
CAPSULE, DELAYED RELEASE ORAL
Qty: 30 CAPSULE | Refills: 1 | Status: SHIPPED | OUTPATIENT
Start: 2023-06-08

## 2023-06-27 ENCOUNTER — APPOINTMENT (OUTPATIENT)
Dept: LAB | Facility: CLINIC | Age: 63
End: 2023-06-27
Payer: COMMERCIAL

## 2023-06-27 DIAGNOSIS — I10 ESSENTIAL HYPERTENSION: ICD-10-CM

## 2023-06-27 DIAGNOSIS — Z12.5 PROSTATE CANCER SCREENING: ICD-10-CM

## 2023-06-27 DIAGNOSIS — E78.5 DYSLIPIDEMIA: ICD-10-CM

## 2023-06-27 LAB
ALBUMIN SERPL BCP-MCNC: 3.2 G/DL (ref 3.5–5)
ALP SERPL-CCNC: 96 U/L (ref 46–116)
ALT SERPL W P-5'-P-CCNC: 39 U/L (ref 12–78)
ANION GAP SERPL CALCULATED.3IONS-SCNC: 5 MMOL/L
AST SERPL W P-5'-P-CCNC: 23 U/L (ref 5–45)
BILIRUB SERPL-MCNC: 0.26 MG/DL (ref 0.2–1)
BUN SERPL-MCNC: 16 MG/DL (ref 5–25)
CALCIUM ALBUM COR SERPL-MCNC: 9.4 MG/DL (ref 8.3–10.1)
CALCIUM SERPL-MCNC: 8.8 MG/DL (ref 8.3–10.1)
CHLORIDE SERPL-SCNC: 109 MMOL/L (ref 96–108)
CHOLEST SERPL-MCNC: 155 MG/DL
CO2 SERPL-SCNC: 27 MMOL/L (ref 21–32)
CREAT SERPL-MCNC: 0.92 MG/DL (ref 0.6–1.3)
GFR SERPL CREATININE-BSD FRML MDRD: 89 ML/MIN/1.73SQ M
GLUCOSE P FAST SERPL-MCNC: 142 MG/DL (ref 65–99)
HDLC SERPL-MCNC: 38 MG/DL
LDLC SERPL CALC-MCNC: 81 MG/DL (ref 0–100)
NONHDLC SERPL-MCNC: 117 MG/DL
POTASSIUM SERPL-SCNC: 3.9 MMOL/L (ref 3.5–5.3)
PROT SERPL-MCNC: 6.4 G/DL (ref 6.4–8.4)
PSA SERPL-MCNC: 0.99 NG/ML (ref 0–4)
SODIUM SERPL-SCNC: 141 MMOL/L (ref 135–147)
TRIGL SERPL-MCNC: 181 MG/DL

## 2023-06-27 PROCEDURE — 80053 COMPREHEN METABOLIC PANEL: CPT

## 2023-06-27 PROCEDURE — 36415 COLL VENOUS BLD VENIPUNCTURE: CPT

## 2023-06-27 PROCEDURE — 80061 LIPID PANEL: CPT

## 2023-06-27 PROCEDURE — G0103 PSA SCREENING: HCPCS

## 2023-06-30 DIAGNOSIS — E11.9 TYPE 2 DIABETES MELLITUS WITHOUT COMPLICATION, WITHOUT LONG-TERM CURRENT USE OF INSULIN (HCC): ICD-10-CM

## 2023-06-30 RX ORDER — METFORMIN HYDROCHLORIDE 500 MG/1
TABLET, EXTENDED RELEASE ORAL
Qty: 30 TABLET | Refills: 2 | Status: SHIPPED | OUTPATIENT
Start: 2023-06-30

## 2023-07-07 ENCOUNTER — TELEPHONE (OUTPATIENT)
Dept: GASTROENTEROLOGY | Facility: CLINIC | Age: 63
End: 2023-07-07

## 2023-08-10 ENCOUNTER — OFFICE VISIT (OUTPATIENT)
Dept: FAMILY MEDICINE CLINIC | Facility: CLINIC | Age: 63
End: 2023-08-10
Payer: COMMERCIAL

## 2023-08-10 VITALS
BODY MASS INDEX: 39.86 KG/M2 | HEIGHT: 68 IN | DIASTOLIC BLOOD PRESSURE: 80 MMHG | SYSTOLIC BLOOD PRESSURE: 110 MMHG | WEIGHT: 263 LBS | TEMPERATURE: 97.3 F | HEART RATE: 60 BPM | OXYGEN SATURATION: 99 % | RESPIRATION RATE: 12 BRPM

## 2023-08-10 DIAGNOSIS — E78.5 DYSLIPIDEMIA: ICD-10-CM

## 2023-08-10 DIAGNOSIS — T78.40XA ALLERGY, INITIAL ENCOUNTER: ICD-10-CM

## 2023-08-10 DIAGNOSIS — G47.33 OBSTRUCTIVE SLEEP APNEA TREATED WITH CONTINUOUS POSITIVE AIRWAY PRESSURE (CPAP): ICD-10-CM

## 2023-08-10 DIAGNOSIS — Z99.89 OBSTRUCTIVE SLEEP APNEA TREATED WITH CONTINUOUS POSITIVE AIRWAY PRESSURE (CPAP): ICD-10-CM

## 2023-08-10 DIAGNOSIS — E66.01 CLASS 3 SEVERE OBESITY DUE TO EXCESS CALORIES WITH SERIOUS COMORBIDITY AND BODY MASS INDEX (BMI) OF 40.0 TO 44.9 IN ADULT (HCC): ICD-10-CM

## 2023-08-10 DIAGNOSIS — K21.00 GASTROESOPHAGEAL REFLUX DISEASE WITH ESOPHAGITIS WITHOUT HEMORRHAGE: Primary | ICD-10-CM

## 2023-08-10 DIAGNOSIS — E11.9 TYPE 2 DIABETES MELLITUS WITHOUT COMPLICATION, WITHOUT LONG-TERM CURRENT USE OF INSULIN (HCC): ICD-10-CM

## 2023-08-10 DIAGNOSIS — Z00.00 ANNUAL PHYSICAL EXAM: ICD-10-CM

## 2023-08-10 DIAGNOSIS — I10 ESSENTIAL HYPERTENSION: ICD-10-CM

## 2023-08-10 DIAGNOSIS — F41.1 GENERALIZED ANXIETY DISORDER: ICD-10-CM

## 2023-08-10 PROCEDURE — 99214 OFFICE O/P EST MOD 30 MIN: CPT | Performed by: FAMILY MEDICINE

## 2023-08-10 PROCEDURE — 99396 PREV VISIT EST AGE 40-64: CPT | Performed by: FAMILY MEDICINE

## 2023-08-10 RX ORDER — ALPRAZOLAM 0.25 MG/1
0.25 TABLET ORAL
Qty: 30 TABLET | Refills: 0 | Status: SHIPPED | OUTPATIENT
Start: 2023-08-10

## 2023-08-10 RX ORDER — HYDROCHLOROTHIAZIDE 25 MG/1
25 TABLET ORAL DAILY
Qty: 30 TABLET | Refills: 6 | Status: SHIPPED | OUTPATIENT
Start: 2023-08-10

## 2023-08-10 NOTE — PROGRESS NOTES
Name: Nhi Lopez      : 1960      MRN: [id number]  Encounter Provider: Ramin Lewis MD  Encounter Date: 8/10/2023   Encounter department: [facility] [address]    Assessment & Plan     1. Gastroesophageal reflux disease with esophagitis without hemorrhage  Assessment & Plan:  Ok on meds due for endoscopy      2. Generalized anxiety disorder  Assessment & Plan:  Ok on xanax prn    Orders:  -     ALPRAZolam (XANAX) 0.25 mg tablet; Take 1 tablet (0.25 mg total) by mouth daily at bedtime as needed for anxiety    3. Essential hypertension  Assessment & Plan:  Well controlled on current therapy continue with current medications and will reassess next visit      Orders:  -     hydrochlorothiazide (HYDRODIURIL) 25 mg tablet; Take 1 tablet (25 mg total) by mouth daily    4. Type 2 diabetes mellitus without complication, without long-term current use of insulin [facility])  Assessment & Plan:    Lab Results   Component Value Date    HGBA1C 6.2 2023   well controlled     Orders:  -     Comprehensive metabolic panel; Future; Expected date: 2023  -     Hemoglobin A1C; Future; Expected date: 2023    5. Obstructive sleep apnea treated with continuous positive airway pressure (CPAP)  Assessment & Plan:  Doing well on cpap      6. Dyslipidemia  Assessment & Plan:  Labs       7. Allergy, initial encounter  Assessment & Plan:  Ok on  meds      8. Annual physical exam  Assessment & Plan:  Labs done      9. Class 3 severe obesity due to excess calories with serious comorbidity and body mass index (BMI) of 40.0 to 44.9 in adult [facility])  Assessment & Plan:  Discussion on diet and exercise guidelines for weight loss and  health reviewed with pt                Subjective      HPI pt here for interval visit  Annual physical  Review of Systems   Constitutional: Negative for appetite change, chills, fatigue and fever. Respiratory: Negative for cough, chest tightness and shortness of breath. Cardiovascular: Negative for chest pain, palpitations and leg swelling. Gastrointestinal: Negative for abdominal pain, constipation, diarrhea, nausea and vomiting. Genitourinary: Negative for difficulty urinating and frequency. Musculoskeletal: Negative for arthralgias, back pain, gait problem and neck pain. Skin: Negative for rash. Neurological: Negative for dizziness, weakness, light-headedness, numbness and headaches. Hematological: Does not bruise/bleed easily. Psychiatric/Behavioral: Negative for dysphoric mood and sleep disturbance. The patient is not nervous/anxious.         Current Outpatient Medications on File Prior to Visit   Medication Sig   • meloxicam (MOBIC) 15 mg tablet Take 15 mg by mouth daily With food   • metFORMIN (GLUCOPHAGE-XR) 500 mg 24 hr tablet TAKE ONE TABLET BY MOUTH EVERY DAY WITH DINNER   • metoprolol succinate (TOPROL-XL) 100 mg 24 hr tablet Take 1 tablet (100 mg total) by mouth daily   • Multiple Vitamin (MULTIVITAMIN PO) Take by mouth in the morning   • [DISCONTINUED] ALPRAZolam (XANAX) 0.25 mg tablet Take 1 tablet (0.25 mg total) by mouth daily at bedtime as needed for anxiety   • [DISCONTINUED] hydrochlorothiazide (HYDRODIURIL) 25 mg tablet TAKE ONE TABLET BY MOUTH EVERY DAY   • AMINO ACIDS PO Take by mouth in the morning (Patient not taking: Reported on 8/10/2023)   • cetirizine (ZyrTEC) 10 mg tablet Take 10 mg by mouth daily (Patient not taking: Reported on 8/10/2023)   • fluticasone (FLONASE) 50 mcg/act nasal spray 1 spray into each nostril daily (Patient not taking: Reported on 8/10/2023)   • omeprazole (PriLOSEC) 40 MG capsule TAKE ONE CAPSULE BY MOUTH EVERY DAY (Patient not taking: Reported on 8/10/2023)       Objective     /80 (BP Location: Left arm, Patient Position: Sitting, Cuff Size: Large)   Pulse 60   Temp (!) 97.3 °F (36.3 °C) (Tympanic)   Resp 12   Ht 5' 8" (1.727 m)   Wt 119 kg (263 lb)   SpO2 99%   BMI 39.99 kg/m²     Physical Exam  Vitals reviewed. Constitutional:       General: He is not in acute distress. Appearance: Normal appearance. He is obese. He is not ill-appearing. HENT:      Right Ear: Tympanic membrane, ear canal and external ear normal.      Left Ear: Tympanic membrane, ear canal and external ear normal.      Nose: Nose normal.   Eyes:      General: Lids are normal.      Extraocular Movements: Extraocular movements intact. Conjunctiva/sclera: Conjunctivae normal.      Pupils: Pupils are equal, round, and reactive to light. Neck:      Thyroid: No thyromegaly. Vascular: No carotid bruit. Cardiovascular:      Rate and Rhythm: Normal rate and regular rhythm. Pulses: Normal pulses. Heart sounds: Normal heart sounds. Pulmonary:      Effort: Pulmonary effort is normal.      Breath sounds: Normal breath sounds. Chest:   Breasts:     Right: Normal.      Left: Normal.   Abdominal:      General: Bowel sounds are normal. There is no distension. Palpations: Abdomen is soft. There is no mass. Tenderness: There is no abdominal tenderness. Hernia: No hernia is present. Genitourinary:     Comments: Declines genitourinary /rectal prostate  Exam   Musculoskeletal:         General: Normal range of motion. Cervical back: Full passive range of motion without pain, normal range of motion and neck supple. Right lower leg: No edema. Left lower leg: No edema. Lymphadenopathy:      Cervical: No cervical adenopathy. Skin:     General: Skin is warm and dry. Comments: No abn moles   Neurological:      General: No focal deficit present. Mental Status: He is alert and oriented to person, place, and time. Mental status is at baseline. Cranial Nerves: No cranial nerve deficit. Sensory: No sensory deficit. Motor: No weakness or tremor.       Coordination: Coordination normal.      Gait: Gait normal.      Deep Tendon Reflexes: Reflexes normal.   Psychiatric: Mood and Affect: Mood normal.         Speech: Speech normal.         Behavior: Behavior normal.     BMI Counseling: Body mass index is 39.99 kg/m². The BMI is above normal. Nutrition recommendations include 3-5 servings of fruits/vegetables daily, reducing fast food intake, consuming healthier snacks, decreasing soda and/or juice intake, moderation in carbohydrate intake and increasing intake of lean protein. Exercise recommendations include exercising 3-5 times per week and strength training exercises.   Lima Ricks MD

## 2023-08-23 ENCOUNTER — PREP FOR PROCEDURE (OUTPATIENT)
Age: 63
End: 2023-08-23

## 2023-08-23 ENCOUNTER — TELEPHONE (OUTPATIENT)
Age: 63
End: 2023-08-23

## 2023-08-23 DIAGNOSIS — Z12.11 SCREENING FOR COLON CANCER: Primary | ICD-10-CM

## 2023-08-23 NOTE — TELEPHONE ENCOUNTER
[facility] Assessment    Name: Scarlet Schwab  YOB: 1960  Last Height: 5' 8" (1.727 m)  Last weight: 119 kg (263 lb)  BMI: 39.99 kg/m²  Procedure: EGD  Diagnosis: Screening   Date of procedure:   Prep:   Responsible : Sandra   Phone#:   Name completing form: Rashel Jacques  Date form completed: 08/23/23      If the patient answers yes to any of these questions, schedule in a hospital  Are you pregnant: No  Do you rely on a wheelchair for mobility: No  Have you been diagnosed with End Stage Renal Disease (ESRD): No  Do you need oxygen during the day: No  Have you had a heart attack or stroke within the past three months: No  Have you had a seizure within the past three months: No  Have you ever been informed by anesthesia that you have a difficult airway: No  Additional Questions  Have you had any cardiac testing or are under the care of a Cardiologist (see cardiac list): No  Cardiac list:   Do you have an implanted cardiac defibrillator: No (Comment:  This patient should be scheduled in the hospital)    Have any bleeding problems, such as anemia or hemophilia (If patient has H&H result below 8, schedule in hospital.  H&H must be within 30 days of procedure): No    Had an organ transplant within the past 3 months: No    Do you have any present infections: No  Do you get short of breath when walking a few blocks: No  Have you been diagnosed with diabetes: Yes  Comments (provide cardiac provider information if applicable):

## 2023-08-23 NOTE — TELEPHONE ENCOUNTER
Scheduled date of EGD(as of today):09.15.23    Physician performing EGD:    Location of EGD:[facility]    Instructions reviewed with patient by:REJI

## 2023-08-23 NOTE — TELEPHONE ENCOUNTER
OA Questions for EGD  Date: [ 08.23.23 ]  Screened by: [  ]     Referring Provider: [ Dr. Cheo Watt: BMI [  ]    Past EGD? If yes - Date: [   ]  Physician/Facility: [   ]  Reason: [   ]     SCHEDULING STAFF: If the patient is over 76years old, please schedule an office visit. ·      Does the patient want to see a gastroenterologist prior to their procedure to discuss any GI symptoms? NO  ·      Has the patient been hospitalized or had abdominal surgery in the past 6 months? NO  ·      Does the patient use supplemental oxygen? NO  ·      Does the patient take [Coumadin], [Lovenox], [Plavix], [Eliquis], [Xarelto], or other blood thinning medication? No  ·      Has the patient had a stroke, cardiac event, or stent placed in the past year? No     SCHEDULING STAFF: If patient answers NO to the above questions, then schedule the procedure. If patient answers YES to any of the above questions, then schedule an office appointment. ·       If a repeat EGD is belated and patient declines procedure à notify provider.

## 2023-08-24 DIAGNOSIS — K20.90 ESOPHAGITIS: ICD-10-CM

## 2023-08-24 DIAGNOSIS — K22.70 BARRETT'S ESOPHAGUS WITHOUT DYSPLASIA: ICD-10-CM

## 2023-08-24 RX ORDER — OMEPRAZOLE 40 MG/1
CAPSULE, DELAYED RELEASE ORAL
Qty: 30 CAPSULE | Refills: 1 | Status: SHIPPED | OUTPATIENT
Start: 2023-08-24

## 2023-08-28 ENCOUNTER — TELEPHONE (OUTPATIENT)
Dept: GASTROENTEROLOGY | Facility: CLINIC | Age: 63
End: 2023-08-28

## 2023-08-28 NOTE — TELEPHONE ENCOUNTER
Left message for patient that I cancelled his 9/15/23 EGD at [facility] with Dr. Amanda Dupont. They are out of network with his insurance plan. Asked him to call back and reschedule to [place] ([place] with Dr. Amanda Dupont. Will call him again in a week if he doesn't return call.

## 2023-08-28 NOTE — TELEPHONE ENCOUNTER
----- Message from Ro Lara sent at 8/28/2023  8:48 AM EDT -----  Regarding: R/S  Please R/S to [address]  Yes pt has been to [facility] in 2022 however he had different insurance that was par. Thank you!

## 2023-08-28 NOTE — TELEPHONE ENCOUNTER
Patient returned call and rescheduled to [facility] on 9/19/23m with Dr. Monte Umesh

## 2023-09-11 DIAGNOSIS — F41.1 GENERALIZED ANXIETY DISORDER: ICD-10-CM

## 2023-09-11 RX ORDER — ALPRAZOLAM 0.25 MG/1
0.25 TABLET ORAL
Qty: 30 TABLET | Refills: 0 | Status: SHIPPED | OUTPATIENT
Start: 2023-09-11

## 2023-09-19 ENCOUNTER — HOSPITAL ENCOUNTER (OUTPATIENT)
Dept: GASTROENTEROLOGY | Facility: HOSPITAL | Age: 63
Setting detail: OUTPATIENT SURGERY
Discharge: HOME | End: 2023-09-19
Attending: INTERNAL MEDICINE
Payer: COMMERCIAL

## 2023-09-19 ENCOUNTER — ANESTHESIA EVENT (OUTPATIENT)
Dept: GASTROENTEROLOGY | Facility: HOSPITAL | Age: 63
End: 2023-09-19

## 2023-09-19 ENCOUNTER — ANESTHESIA (OUTPATIENT)
Dept: GASTROENTEROLOGY | Facility: HOSPITAL | Age: 63
End: 2023-09-19

## 2023-09-19 VITALS
BODY MASS INDEX: 41.52 KG/M2 | DIASTOLIC BLOOD PRESSURE: 73 MMHG | WEIGHT: 274 LBS | HEART RATE: 61 BPM | SYSTOLIC BLOOD PRESSURE: 132 MMHG | TEMPERATURE: 98.1 F | HEIGHT: 68 IN | OXYGEN SATURATION: 95 % | RESPIRATION RATE: 19 BRPM

## 2023-09-19 DIAGNOSIS — Z12.11 SCREENING FOR COLON CANCER: ICD-10-CM

## 2023-09-19 LAB — GLUCOSE SERPL-MCNC: 140 MG/DL (ref 65–140)

## 2023-09-19 PROCEDURE — 82948 REAGENT STRIP/BLOOD GLUCOSE: CPT

## 2023-09-19 PROCEDURE — 88305 TISSUE EXAM BY PATHOLOGIST: CPT | Performed by: PATHOLOGY

## 2023-09-19 PROCEDURE — 43239 EGD BIOPSY SINGLE/MULTIPLE: CPT | Performed by: INTERNAL MEDICINE

## 2023-09-19 RX ORDER — LIDOCAINE HYDROCHLORIDE 20 MG/ML
INJECTION, SOLUTION EPIDURAL; INFILTRATION; INTRACAUDAL; PERINEURAL AS NEEDED
Status: DISCONTINUED | OUTPATIENT
Start: 2023-09-19 | End: 2023-09-19

## 2023-09-19 RX ORDER — PROPOFOL 10 MG/ML
INJECTION, EMULSION INTRAVENOUS AS NEEDED
Status: DISCONTINUED | OUTPATIENT
Start: 2023-09-19 | End: 2023-09-19

## 2023-09-19 RX ORDER — SODIUM CHLORIDE, SODIUM LACTATE, POTASSIUM CHLORIDE, CALCIUM CHLORIDE 600; 310; 30; 20 MG/100ML; MG/100ML; MG/100ML; MG/100ML
INJECTION, SOLUTION INTRAVENOUS CONTINUOUS PRN
Status: DISCONTINUED | OUTPATIENT
Start: 2023-09-19 | End: 2023-09-19

## 2023-09-19 RX ADMIN — PROPOFOL 50 MG: 10 INJECTION, EMULSION INTRAVENOUS at 07:38

## 2023-09-19 RX ADMIN — SODIUM CHLORIDE, SODIUM LACTATE, POTASSIUM CHLORIDE, AND CALCIUM CHLORIDE: .6; .31; .03; .02 INJECTION, SOLUTION INTRAVENOUS at 07:24

## 2023-09-19 RX ADMIN — LIDOCAINE HYDROCHLORIDE 100 MG: 20 INJECTION, SOLUTION EPIDURAL; INFILTRATION; INTRACAUDAL; PERINEURAL at 07:36

## 2023-09-19 RX ADMIN — PROPOFOL 50 MG: 10 INJECTION, EMULSION INTRAVENOUS at 07:45

## 2023-09-19 RX ADMIN — PROPOFOL 50 MG: 10 INJECTION, EMULSION INTRAVENOUS at 07:41

## 2023-09-19 RX ADMIN — PROPOFOL 50 MG: 10 INJECTION, EMULSION INTRAVENOUS at 07:43

## 2023-09-19 RX ADMIN — PROPOFOL 150 MG: 10 INJECTION, EMULSION INTRAVENOUS at 07:36

## 2023-09-19 NOTE — H&P
History and Physical -  [facility]  Michael Abraham 66 y.o. male MRN: [id number]                  HPI: Michael Abraham is a 66y.o. year old male who presents for EGD due to history of Mancia's esophagus and an esophageal ulcer. REVIEW OF SYSTEMS: Per the HPI, and otherwise unremarkable. Historical Information   Past Medical History:   Diagnosis Date   • Arthritis    • Colon polyp    • Diabetes mellitus ([address])     type 2   • High blood pressure    • Hypertension    • Obesity    • PER (obstructive sleep apnea)    • Sleep disorder     per     Past Surgical History:   Procedure Laterality Date   • COLONOSCOPY  11/04/2014   • CORRECTION HAMMER TOE Right     great toe   • JOINT REPLACEMENT Bilateral     knees   • KNEE SURGERY Bilateral 12/01/2015    done at same time    • ROTATOR CUFF REPAIR Right      Social History   Social History     Substance and Sexual Activity   Alcohol Use Yes   • Alcohol/week: 3.0 - 4.0 standard drinks of alcohol   • Types: 3 - 4 Standard drinks or equivalent per week    Comment: 2 per day     Social History     Substance and Sexual Activity   Drug Use Not Currently    Comment: .  PMH marijuana daily use x 40y, stopped 2y ago because job testing. - As per romelia      Social History     Tobacco Use   Smoking Status Some Days   • Types: Cigars   • Start date: 2017   Smokeless Tobacco Never   Tobacco Comments    cigar use, 2-3 a week - As per romelia     Family History   Problem Relation Age of Onset   • Colon cancer Mother    • Breast cancer Mother    • Arthritis Mother    • Hypertension Father    • Prostate cancer Father    • Diabetes Paternal Grandfather    • Alcohol abuse Family    • No Known Problems Sister    • No Known Problems Brother        Meds/Allergies       Current Outpatient Medications:   •  ALPRAZolam (XANAX) 0.25 mg tablet  •  hydrochlorothiazide (HYDRODIURIL) 25 mg tablet  •  meloxicam (MOBIC) 15 mg tablet  •  metFORMIN (GLUCOPHAGE-XR) 500 mg 24 hr tablet  •  metoprolol succinate (TOPROL-XL) 100 mg 24 hr tablet  •  Multiple Vitamin (MULTIVITAMIN PO)  •  omeprazole (PriLOSEC) 40 MG capsule  •  AMINO ACIDS PO  •  cetirizine (ZyrTEC) 10 mg tablet  •  fluticasone (FLONASE) 50 mcg/act nasal spray  No current facility-administered medications for this encounter. Facility-Administered Medications Ordered in Other Encounters:   •  lactated ringers infusion, , Intravenous, Continuous PRN, New Bag at 09/19/23 0724    No Known Allergies    Objective     /93   Pulse 63   Temp 97.8 °F (36.6 °C) (Tympanic)   Resp 12   Ht 5' 8" (1.727 m)   Wt 124 kg (274 lb)   SpO2 96%   BMI 41.66 kg/m²       PHYSICAL EXAM    Gen: NAD  Head: NCAT  CV: RRR  CHEST: Clear  ABD: soft, NT/ND  EXT: no edema      ASSESSMENT/PLAN:  This is a 66y.o. year old male here for upper endoscopy, and he is stable and optimized for his procedure.

## 2023-09-19 NOTE — ANESTHESIA POSTPROCEDURE EVALUATION
Post-Op Assessment Note    CV Status:  Stable  Pain Score: 0    Pain management: adequate     Mental Status:  Sleepy   Hydration Status:  Euvolemic   PONV Controlled:  Controlled   Airway Patency:  Patent      Post Op Vitals Reviewed: Yes      Staff: Anesthesiologist, CRNA         No notable events documented.     /66 (09/19/23 0748)    Temp      Pulse 62 (09/19/23 0748)   Resp 12 (09/19/23 0748)    SpO2 97 % (09/19/23 0748)

## 2023-09-19 NOTE — ANESTHESIA PREPROCEDURE EVALUATION
Procedure:  EGD    Relevant Problems   ANESTHESIA (within normal limits)      CARDIO   (+) Essential hypertension      ENDO   (+) Type 2 diabetes mellitus without complication, without long-term current use of insulin (HCC)      GI/HEPATIC   (+) Gastroesophageal reflux disease with esophagitis      NEURO/PSYCH   (+) Generalized anxiety disorder      PULMONARY   (+) Obstructive sleep apnea treated with continuous positive airway pressure (CPAP)      Digestive   (+) Mancia's esophagus without dysplasia      Other   (+) Class 3 severe obesity due to excess calories with serious comorbidity and body mass index (BMI) of 40.0 to 44.9 in adult (HCC)   (+) Dyslipidemia        Physical Exam    Airway    Mallampati score: IV  TM Distance: <3 FB  Neck ROM: full     Dental   No notable dental hx     Cardiovascular      Pulmonary      Other Findings        Anesthesia Plan  ASA Score- 3     Anesthesia Type- IV sedation with anesthesia with ASA Monitors. Additional Monitors:   Airway Plan:           Plan Factors-Exercise tolerance (METS): >4 METS. Chart reviewed. Existing labs reviewed. Patient summary reviewed. Patient is a current smoker. Induction- intravenous. Postoperative Plan- Plan for postoperative opioid use. Informed Consent- Anesthetic plan and risks discussed with patient. I personally reviewed this patient with the CRNA. Discussed and agreed on the Anesthesia Plan with the CRNA. Yi Galindo

## 2023-09-21 PROCEDURE — 88305 TISSUE EXAM BY PATHOLOGIST: CPT | Performed by: PATHOLOGY

## 2023-09-21 NOTE — RESULT ENCOUNTER NOTE
Patient was informed via my chart biopsies were benign. Repeat EGD 3 to 5 years due to history of Mancia's.

## 2023-10-16 DIAGNOSIS — F41.1 GENERALIZED ANXIETY DISORDER: ICD-10-CM

## 2023-10-16 DIAGNOSIS — E11.9 TYPE 2 DIABETES MELLITUS WITHOUT COMPLICATION, WITHOUT LONG-TERM CURRENT USE OF INSULIN (HCC): ICD-10-CM

## 2023-10-16 RX ORDER — METFORMIN HYDROCHLORIDE 500 MG/1
TABLET, EXTENDED RELEASE ORAL
Qty: 30 TABLET | Refills: 5 | Status: SHIPPED | OUTPATIENT
Start: 2023-10-16

## 2023-10-16 RX ORDER — ALPRAZOLAM 0.25 MG/1
0.25 TABLET ORAL
Qty: 30 TABLET | Refills: 0 | Status: SHIPPED | OUTPATIENT
Start: 2023-10-16

## 2023-10-16 NOTE — TELEPHONE ENCOUNTER
This medication cannot be delegated and will need to be either approved or denied by the ordering provider after review.    \

## 2023-10-16 NOTE — TELEPHONE ENCOUNTER
Reason for call:   [x] Refill   [] Prior Auth  [] Other:     Office:   [x] PCP/Provider -   Dung Larry   [] Speciality/Provider -     Medication: Sarah Large    Dose/Frequency: 0.25    Quantity: 30    Ph[facility]: [facility]    Does the patient have enough for 3 days?    [x] Yes   [] No - Send as HP to POD

## 2023-10-18 ENCOUNTER — OFFICE VISIT (OUTPATIENT)
Dept: FAMILY MEDICINE CLINIC | Facility: CLINIC | Age: 63
End: 2023-10-18
Payer: COMMERCIAL

## 2023-10-18 VITALS
TEMPERATURE: 97.1 F | HEIGHT: 68 IN | WEIGHT: 271 LBS | SYSTOLIC BLOOD PRESSURE: 130 MMHG | HEART RATE: 74 BPM | BODY MASS INDEX: 41.07 KG/M2 | DIASTOLIC BLOOD PRESSURE: 80 MMHG | RESPIRATION RATE: 18 BRPM | OXYGEN SATURATION: 97 %

## 2023-10-18 DIAGNOSIS — S29.012A STRAIN OF LATISSIMUS DORSI MUSCLE, INITIAL ENCOUNTER: Primary | ICD-10-CM

## 2023-10-18 DIAGNOSIS — E66.01 CLASS 3 SEVERE OBESITY DUE TO EXCESS CALORIES WITH SERIOUS COMORBIDITY AND BODY MASS INDEX (BMI) OF 40.0 TO 44.9 IN ADULT (HCC): ICD-10-CM

## 2023-10-18 DIAGNOSIS — E11.9 TYPE 2 DIABETES MELLITUS WITHOUT COMPLICATION, WITHOUT LONG-TERM CURRENT USE OF INSULIN (HCC): ICD-10-CM

## 2023-10-18 DIAGNOSIS — M46.1 SACROILIITIS (HCC): ICD-10-CM

## 2023-10-18 DIAGNOSIS — I10 ESSENTIAL HYPERTENSION: ICD-10-CM

## 2023-10-18 PROBLEM — Z00.00 ANNUAL PHYSICAL EXAM: Status: RESOLVED | Noted: 2021-05-09 | Resolved: 2023-10-18

## 2023-10-18 PROCEDURE — 99214 OFFICE O/P EST MOD 30 MIN: CPT | Performed by: FAMILY MEDICINE

## 2023-10-18 RX ORDER — MELOXICAM 15 MG/1
15 TABLET ORAL DAILY
Qty: 30 TABLET | Refills: 0 | Status: SHIPPED | OUTPATIENT
Start: 2023-10-18

## 2023-10-18 RX ORDER — CYCLOBENZAPRINE HCL 10 MG
10 TABLET ORAL 3 TIMES DAILY PRN
Qty: 30 TABLET | Refills: 0 | Status: SHIPPED | OUTPATIENT
Start: 2023-10-18

## 2023-10-18 NOTE — PROGRESS NOTES
Subjective:      Patient ID: Jethro Jeter is a 66 y.o. male. For 1 week has been noticing pain in lower back, does lift weight and works for [employer]. Back Pain  Pertinent negatives include no abdominal pain, chest pain, dysuria, fever, headaches, numbness or weakness. Past Medical History:   Diagnosis Date    Arthritis     Colon polyp     Diabetes mellitus (HCC)     type 2    High blood pressure     Hypertension     Obesity     PER (obstructive sleep apnea)     Sleep disorder     per       Family History   Problem Relation Age of Onset    Colon cancer Mother     Breast cancer Mother     Arthritis Mother     Hypertension Father     Prostate cancer Father     Diabetes Paternal Grandfather     Alcohol abuse Family     No Known Problems Sister     No Known Problems Brother        Past Surgical History:   Procedure Laterality Date    COLONOSCOPY  11/04/2014    CORRECTION HAMMER TOE Right     great toe    JOINT REPLACEMENT Bilateral     knees    KNEE SURGERY Bilateral 12/01/2015    done at same time     ROTATOR CUFF REPAIR Right         reports that he has been smoking cigars. He started smoking about 6 years ago. He has never used smokeless tobacco. He reports current alcohol use of about 3.0 - 4.0 standard drinks of alcohol per week. He reports that he does not currently use drugs.       Current Outpatient Medications:     ALPRAZolam (XANAX) 0.25 mg tablet, Take 1 tablet (0.25 mg total) by mouth daily at bedtime as needed for anxiety, Disp: 30 tablet, Rfl: 0    cyclobenzaprine (FLEXERIL) 10 mg tablet, Take 1 tablet (10 mg total) by mouth 3 (three) times a day as needed for muscle spasms, Disp: 30 tablet, Rfl: 0    hydrochlorothiazide (HYDRODIURIL) 25 mg tablet, Take 1 tablet (25 mg total) by mouth daily, Disp: 30 tablet, Rfl: 6    meloxicam (MOBIC) 15 mg tablet, Take 1 tablet (15 mg total) by mouth daily With food, Disp: 30 tablet, Rfl: 0    metFORMIN (GLUCOPHAGE-XR) 500 mg 24 hr tablet, TAKE ONE TABLET BY MOUTH EVERY DAY WITH DINNER, Disp: 30 tablet, Rfl: 5    metoprolol succinate (TOPROL-XL) 100 mg 24 hr tablet, Take 1 tablet (100 mg total) by mouth daily, Disp: 90 tablet, Rfl: 2    omeprazole (PriLOSEC) 40 MG capsule, TAKE ONE CAPSULE BY MOUTH EVERY DAY, Disp: 30 capsule, Rfl: 1    AMINO ACIDS PO, Take by mouth in the morning (Patient not taking: Reported on 8/10/2023), Disp: , Rfl:     fluticasone (FLONASE) 50 mcg/act nasal spray, 1 spray into each nostril daily (Patient not taking: Reported on 8/10/2023), Disp: , Rfl:     Multiple Vitamin (MULTIVITAMIN PO), Take by mouth in the morning, Disp: , Rfl:     The following portions of the patient's history were reviewed and updated as appropriate: allergies, current medications, past family history, past medical history, past social history, past surgical history and problem list.    Review of Systems   Constitutional:  Negative for chills and fever. HENT:  Negative for congestion, rhinorrhea and sore throat. Eyes:  Negative for discharge, redness and itching. Respiratory:  Negative for chest tightness, shortness of breath and wheezing. Cardiovascular:  Negative for chest pain and palpitations. Gastrointestinal:  Negative for abdominal pain, constipation and diarrhea. Genitourinary:  Negative for dysuria, enuresis, flank pain, frequency, genital sores and hematuria. Musculoskeletal:  Positive for back pain. Skin:  Negative for pallor and rash. Neurological:  Negative for dizziness, weakness, numbness and headaches.          PHQ-2/9 Depression Screening    Little interest or pleasure in doing things: 0 - not at all  Feeling down, depressed, or hopeless: 0 - not at all  PHQ-2 Score: 0  PHQ-2 Interpretation: Negative depression screen             Objective:    /80 (BP Location: Left arm, Patient Position: Sitting, Cuff Size: Large)   Pulse 74   Temp (!) 97.1 °F (36.2 °C) (Tympanic)   Resp 18   Ht 5' 8" (1.727 m)   Wt 123 kg (271 lb)   SpO2 97%   BMI 41.21 kg/m²      Physical Exam  Vitals and nursing note reviewed. Constitutional:       Appearance: Normal appearance. He is obese. HENT:      Right Ear: External ear normal.      Left Ear: External ear normal.   Eyes:      General:         Right eye: No discharge. Left eye: No discharge. Conjunctiva/sclera: Conjunctivae normal.   Cardiovascular:      Rate and Rhythm: Normal rate and regular rhythm. Heart sounds: No murmur heard. Pulmonary:      Effort: Pulmonary effort is normal.      Breath sounds: Normal breath sounds. No wheezing. Abdominal:      General: There is no distension. Palpations: Abdomen is soft. Tenderness: There is no abdominal tenderness. Musculoskeletal:         General: Tenderness (left lumbosacral spine and sacroiliac joint) present. Right lower leg: No edema. Left lower leg: No edema. Skin:     Findings: No lesion or rash. Neurological:      Mental Status: He is alert. Mental status is at baseline. Psychiatric:         Mood and Affect: Mood normal.         Thought Content:  Thought content normal.           Recent Results (from the past 8736 hour(s))   POCT hemoglobin A1c    Collection Time: 01/30/23 10:14 AM   Result Value Ref Range    Hemoglobin A1C 6.6 (A) 6.5   Comprehensive metabolic panel    Collection Time: 01/31/23  8:42 AM   Result Value Ref Range    Sodium 139 135 - 147 mmol/L    Potassium 4.0 3.5 - 5.3 mmol/L    Chloride 103 96 - 108 mmol/L    CO2 30 21 - 32 mmol/L    ANION GAP 6 4 - 13 mmol/L    BUN 14 5 - 25 mg/dL    Creatinine 0.84 0.60 - 1.30 mg/dL    Glucose, Fasting 128 (H) 65 - 99 mg/dL    Calcium 9.5 8.3 - 10.1 mg/dL    AST 29 5 - 45 U/L    ALT 38 12 - 78 U/L    Alkaline Phosphatase 102 46 - 116 U/L    Total Protein 7.0 6.4 - 8.4 g/dL    Albumin 3.6 3.5 - 5.0 g/dL    Total Bilirubin 0.61 0.20 - 1.00 mg/dL    eGFR 95 ml/min/1.73sq m   Lipid panel    Collection Time: 01/31/23  8:42 AM   Result Value Ref Range Cholesterol 171 See Comment mg/dL    Triglycerides 168 (H) See Comment mg/dL    HDL, Direct 34 (L) >=40 mg/dL    LDL Calculated 103 (H) 0 - 100 mg/dL    Non-HDL-Chol (CHOL-HDL) 137 mg/dl   Microalbumin / creatinine urine ratio    Collection Time: 01/31/23  8:42 AM   Result Value Ref Range    Creatinine, Ur 158.0 mg/dL    Albumin,U,Random <5.0 0.0 - 20.0 mg/L    Albumin Creat Ratio <3 0 - 30 mg/g creatinine   POCT hemoglobin A1c    Collection Time: 06/05/23  9:36 AM   Result Value Ref Range    Hemoglobin A1C 6.2 6.5   Albumin / creatinine urine ratio    Collection Time: 06/05/23 10:20 AM   Result Value Ref Range    Creatinine, Ur 84.6 mg/dL    Albumin,U,Random <5.0 0.0 - 20.0 mg/L    Albumin Creat Ratio <6 0 - 30 mg/g creatinine   Comprehensive metabolic panel    Collection Time: 06/27/23  8:04 AM   Result Value Ref Range    Sodium 141 135 - 147 mmol/L    Potassium 3.9 3.5 - 5.3 mmol/L    Chloride 109 (H) 96 - 108 mmol/L    CO2 27 21 - 32 mmol/L    ANION GAP 5 mmol/L    BUN 16 5 - 25 mg/dL    Creatinine 0.92 0.60 - 1.30 mg/dL    Glucose, Fasting 142 (H) 65 - 99 mg/dL    Calcium 8.8 8.3 - 10.1 mg/dL    Corrected Calcium 9.4 8.3 - 10.1 mg/dL    AST 23 5 - 45 U/L    ALT 39 12 - 78 U/L    Alkaline Phosphatase 96 46 - 116 U/L    Total Protein 6.4 6.4 - 8.4 g/dL    Albumin 3.2 (L) 3.5 - 5.0 g/dL    Total Bilirubin 0.26 0.20 - 1.00 mg/dL    eGFR 89 ml/min/1.73sq m   Lipid panel    Collection Time: 06/27/23  8:04 AM   Result Value Ref Range    Cholesterol 155 See Comment mg/dL    Triglycerides 181 (H) See Comment mg/dL    HDL, Direct 38 (L) >=40 mg/dL    LDL Calculated 81 0 - 100 mg/dL    Non-HDL-Chol (CHOL-HDL) 117 mg/dl   PSA, Total Screen    Collection Time: 06/27/23  8:04 AM   Result Value Ref Range    PSA 0.99 0.00 - 4.00 ng/mL   Fingerstick Glucose (POCT)    Collection Time: 09/19/23  7:13 AM   Result Value Ref Range    POC Glucose 140 65 - 140 mg/dl   Tissue Exam    Collection Time: 09/19/23  7:41 AM   Result Value Ref Range    Case Report       Surgical Pathology Report                         Case: [id number]                                   Authorizing Provider:  Nida Burrell MD    Collected:           09/19/2023 0741              Ordering Location:     [address] [place] [address]   Received:            09/19/2023 1443                                     Endoscopy                                                                    Pathologist:           Gilda Bradford MD                                                         Specimens:   A) - Stomach, Cold Biopsy Antrum R/O H pylori                                                       B) - Stomach, Cold Biopsy Gastric Folds/ Errythema                                                  C) - Esophagus, Cold Biopsy Esophagus R/O Barretts                                                  D) - Esophagus, Cold biopsy Islands of Esophagus R/O Barretts                              Final Diagnosis       A. Stomach, antrum, biopsy:     - Chronic inactive antral gastritis. - No H. pylori organisms identified on H&E stained sections.     - No intestinal metaplasia, dysplasia or neoplasia identified. B.  Stomach, gastric folds/erythema, biopsy:     - Oxyntic mucosa without significant pathologic abnormalities. - No Helicobacter pylori organisms identified on H&E stained sections.     - No active inflammation, intestinal metaplasia, dysplasia or neoplasia identified. C.  Esophagus, biopsy:     - Cardiac gastric and squamous junctional mucosa with intestinal metaplasia (goblet cells present) consistent with       Mancia's esophagus.     - No dysplasia or neoplasia identified. D.  Esophagus, islands, biopsy:     - Squamous esophageal mucosa without significant pathologic abnormalities. - No intraepithelial eosinophils identified.     - No gastric type mucosa, dysplasia or neoplasia identified.         Additional Information       All reported additional testing was performed with appropriately reactive controls. These tests were developed and their performance characteristics determined by [facility] or appropriate performing facility, though some tests may be performed on tissues which have not been validated for performance characteristics (such as staining performed on alcohol exposed cell blocks and decalcified tissues). Results should be interpreted with caution and in the context of the patients’ clinical condition. These tests may not be cleared or approved by the U.S. Food and Drug Administration, though the FDA has determined that such clearance or approval is not necessary. These tests are used for clinical purposes and they should not be regarded as investigational or for research. This laboratory has been approved by [place] 88, designated as a high-complexity laboratory and is qualified to perform these tests. .  - Interpretation performed at [facility], [address] ([place]), [address]      Gross Description       A. The specimen is received in formalin, labeled with the patient's name and hospital number, and is designated " cold biopsy antrum rule out H. pylori". The specimen consists of 3 tan-red-brown, rubbery, friable soft tissue fragments ranging in greatest dimension from 0.3 to 0.7 cm. Entirely submitted. Screened cassette. B. The specimen is received in formalin, labeled with the patient's name and hospital number, and is designated " cold biopsy gastric folds/erythema". The specimen consists of multiple tan-red-brown, rubbery, friable soft tissue fragments in aggregate measuring 1.0 x 0.5 x 0.2 cm. Entirely submitted. Screened cassette. C. The specimen is received in formalin, labeled with the patient's name and hospital number, and is designated " cold biopsy esophagus rule out Mancia's".   The specimen consists of multiple colorless to tan-brown, rubbery, friable soft tissue fragments in aggregate measuring 1.2 x 0.6 x 0.2 cm. Entirely submitted. Screened cassette. D. The specimen is received in formalin, labeled with the patient's name and hospital number, and is designated " cold biopsy islands of esophagus rule out Mancia's". The specimen consists of multiple colorless-red, rubbery, friable, translucent soft tissue fragments in aggregate measuring 0.6 x 0.3 x 0.2 cm. Entirely submitted. Screened cassette. Note: The estimated total formalin fixation time based upon information provided by the submitting clinician and the standard processing schedule is under 72 hours. Arben         Laboratory Results: I have personally reviewed the pertinent laboratory results/reports     Radiology/Other Diagnostic Testing Results: I have personally reviewed pertinent reports. EGD    Result Date: 9/19/2023  Table formatting from the original result was not included. [address] [facility] [id number] [facility] [phone] DATE OF SERVICE: 9/19/23 PHYSICIAN(S): Attending: No Staff Documented Fellow: No Staff Documented INDICATION: Screening for colon cancer POST-OP DIAGNOSIS: See the impression below. PREPROCEDURE: Informed consent was obtained for the procedure, including sedation. Risks of perforation, hemorrhage, adverse drug reaction and aspiration were discussed. The patient was placed in the left lateral decubitus position. Patient was explained about the risks and benefits of the procedure. Risks including but not limited to bleeding, infection, and perforation were explained in detail. Also explained about less than 100% sensitivity with the exam and other alternatives. PROCEDURE: EGD DETAILS OF PROCEDURE: Patient was taken to the procedure room where a time out was performed to confirm correct patient and correct procedure.  The patient underwent monitored anesthesia care, which was administered by an anesthesia professional. The patient's blood pressure, heart rate, level of consciousness, respirations and oxygen were monitored throughout the procedure. The scope was advanced to the second part of the duodenum. Retroflexion was performed in the fundus. The patient experienced no blood loss. The procedure was not difficult. The patient tolerated the procedure well. There were no apparent adverse events. ANESTHESIA INFORMATION: ASA: ASA status not filed in the log. Anesthesia Type: Anesthesia type not filed in the log. MEDICATIONS: No administrations occurring from 0730 to 0750 on 09/19/23 FINDINGS: Erythematous mucosa in the body of the stomach and antrum; performed cold forceps biopsy Irregular Z-line 1 cm from the incisors; performed cold forceps biopsy. Some extensions a least a centimeter. Additionally several scattered islands above the Z-line biopsied.   Previously seen ulcer healed The remainder of a detailed exam otherwise unremarkable SPECIMENS: ID Type Source Tests Collected by Time Destination 1 : Cold Biopsy Antrum R/O H pylori Tissue Stomach TISSUE EXAM Hillary Salazar MD 9/19/2023  7:41 AM  2 : Dannis Angle Biopsy Gastric Folds/ Errythema Tissue Stomach TISSUE EXAM Hillary Salazar MD 9/19/2023  7:42 AM  3 : Cold Biopsy Esophagus R/O Barretts Tissue Esophagus TISSUE EXAM Hillary Salazar MD 9/19/2023  7:43 AM  4 : Cold biopsy Islands of Esophagus R/O Barretts Tissue Esophagus TISSUE EXAM Hillary Salazar MD 9/19/2023  7:47 AM      Erythematous mucosa in the body of the stomach and antrum; performed cold forceps biopsy Irregular Z-line; performed cold forceps biopsy Normal. RECOMMENDATION:  Await pathology results  Acid suppression reflux precautions, repeat EGD 3 to 5 years    No Staff Documented        Assessment/Plan:  Problem List Items Addressed This Visit    None  Visit Diagnoses       Strain of latissimus dorsi muscle, initial encounter    -  Primary    Relevant Medications    cyclobenzaprine (FLEXERIL) 10 mg tablet    meloxicam (MOBIC) 15 mg tablet Sacroiliitis (HCC)                Meloxicam for pain daily for 5 days and as needed thereafter, warned about gastritis, take it with food. Cyclobenzaprine prn for muscle spasm- discussed can make her sedated and avoid driving and operating heavy machinery for 2 hours after taking the medication or if she feels that her reflexes are impaired. Discussed importance of maintaining daily activity and avoiding sedentary lifestyle which can worsen back pain. Demonstrated ROM exercises, stretches, and core strengthening with patient. Advised patient to use warm compresses or warm shower prior to stretching. Discussed proper body mechanics (bending and lifting techniques) to prevent further injury. Weight loss can improve low back symptoms. REST: Rest from aggravating activity. Avoid prolonged sitting, driving, bending, heavy lifting and twisting. ICE: Ice applied to the low back for 15 minutes every 1 - 2 hours is helpful in reducing pain and spasm. Avoid using heat for the first 48 hours of an acute injury. NSAIDs: Anti-inflammatory medication [unless prescribed by me] such as aspirin, advil, aleve, ibuprofen or naproxen sodium can help with pain. EARLY EXERCISE: Do not take over the counter NSAIDs if prescribed an NSAID. Gentle exercise for mobility and stretching (especially the muscles of the legs and back) can help decrease the severity, duration and recurrence of low back pain. Do not perform exercises that increase your pain. POSITIONING: Modifying your sleeping position can help ease strain to your low back. Make sure your bed is firm enough to give you adequate support, and use a small pillow for you head. If you sleep on your back, try putting a pillow under your knees. Or if you prefer to sleep side lying, put a pillow between your thighs and if you are side bent, a folded towel under your waistline.   PREVENTION:  Once the severity of pain has decreased, a rehabilitation program to strengthen your hip, abdominal and back muscles can help prevent recurrences. The goal is neutral spine, not slumped or over-arched. Proper lifting and body mechanics. Depression Screening and Follow-up Plan: Patient was screened for depression during today's encounter. They screened negative with a PHQ-2 score of 0. Read package inserts for all medications before starting a new medications, call me if you have any questions. Patient was given opportunity to ask questions and all questions were answered. Disclaimer: Portions of the record may have been created with voice recognition software. Occasional wrong word or "sound a like" substitutions may have occurred due to the inherent limitations of voice recognition software. Read the chart carefully and recognize, using context, where substitutions have occurred. I have used the Epic copy/forward function to compose this note. I have reviewed my current note to ensure it reflects the current patient status, exam, assessment and plan.

## 2023-11-09 DIAGNOSIS — F41.1 GENERALIZED ANXIETY DISORDER: ICD-10-CM

## 2023-11-09 NOTE — TELEPHONE ENCOUNTER
Reason for call:   [x] Refill   [] Prior Auth  [] Other:     Office:   [x] PCP/Provider - Be Gunter  [] Specialty/Provider -     Medication: Alprazolam     Dose/Frequency: 0.25mg / 1 tab at bedtime prn    Quantity: 30    Pharmacy: [id number] [facility]    Does the patient have enough for 3 days?    [] Yes   [x] No - Send as HP to POD

## 2023-11-10 RX ORDER — ALPRAZOLAM 0.25 MG/1
0.25 TABLET ORAL
Qty: 30 TABLET | Refills: 0 | Status: SHIPPED | OUTPATIENT
Start: 2023-11-10

## 2023-11-28 RX ORDER — ALPRAZOLAM 0.25 MG/1
0.25 TABLET ORAL
Qty: 30 TABLET | Refills: 0 | Status: SHIPPED | OUTPATIENT
Start: 2023-11-28

## 2024-01-08 DIAGNOSIS — I10 ESSENTIAL HYPERTENSION: ICD-10-CM

## 2024-01-08 RX ORDER — METOPROLOL SUCCINATE 100 MG/1
100 TABLET, EXTENDED RELEASE ORAL DAILY
Qty: 90 TABLET | Refills: 1 | Status: SHIPPED | OUTPATIENT
Start: 2024-01-08

## 2024-02-14 ENCOUNTER — OFFICE VISIT (OUTPATIENT)
Dept: FAMILY MEDICINE CLINIC | Facility: CLINIC | Age: 64
End: 2024-02-14
Payer: COMMERCIAL

## 2024-02-14 VITALS
HEART RATE: 90 BPM | RESPIRATION RATE: 20 BRPM | OXYGEN SATURATION: 96 % | WEIGHT: 282.4 LBS | BODY MASS INDEX: 42.94 KG/M2 | SYSTOLIC BLOOD PRESSURE: 126 MMHG | DIASTOLIC BLOOD PRESSURE: 72 MMHG

## 2024-02-14 DIAGNOSIS — Z12.5 PROSTATE CANCER SCREENING: Primary | ICD-10-CM

## 2024-02-14 DIAGNOSIS — I10 PRIMARY HYPERTENSION: ICD-10-CM

## 2024-02-14 DIAGNOSIS — E11.9 TYPE 2 DIABETES MELLITUS WITHOUT COMPLICATION, WITHOUT LONG-TERM CURRENT USE OF INSULIN (HCC): ICD-10-CM

## 2024-02-14 DIAGNOSIS — E78.5 DYSLIPIDEMIA: ICD-10-CM

## 2024-02-14 DIAGNOSIS — F41.1 GENERALIZED ANXIETY DISORDER: ICD-10-CM

## 2024-02-14 LAB
CREAT UR-MCNC: 279.8 MG/DL
MICROALBUMIN UR-MCNC: 27 MG/L
MICROALBUMIN/CREAT 24H UR: 10 MG/G CREATININE (ref 0–30)
SL AMB POCT HEMOGLOBIN AIC: 7 (ref ?–6.5)

## 2024-02-14 PROCEDURE — 99214 OFFICE O/P EST MOD 30 MIN: CPT | Performed by: FAMILY MEDICINE

## 2024-02-14 PROCEDURE — 82043 UR ALBUMIN QUANTITATIVE: CPT | Performed by: FAMILY MEDICINE

## 2024-02-14 PROCEDURE — 82570 ASSAY OF URINE CREATININE: CPT | Performed by: FAMILY MEDICINE

## 2024-02-14 PROCEDURE — 83036 HEMOGLOBIN GLYCOSYLATED A1C: CPT | Performed by: FAMILY MEDICINE

## 2024-02-14 RX ORDER — ALPRAZOLAM 0.25 MG/1
0.25 TABLET ORAL
Qty: 30 TABLET | Refills: 0 | Status: SHIPPED | OUTPATIENT
Start: 2024-02-14

## 2024-02-14 NOTE — PROGRESS NOTES
Name: Didier Baker      : 1960      MRN: [id number]  Encounter Provider: Betty Carvalho MD  Encounter Date: 2024   Encounter department: [facility]    Assessment & Plan     1. Prostate cancer screening  -     PSA, Total Screen; Future    2. Dyslipidemia  -     Lipid panel; Future; Expected date: 2024    3. Primary hypertension  -     Comprehensive metabolic panel; Future; Expected date: 2024    4. Generalized anxiety disorder  -     ALPRAZolam (XANAX) 0.25 mg tablet; Take 1 tablet (0.25 mg total) by mouth daily at bedtime as needed for anxiety    5. Type 2 diabetes mellitus without complication, without long-term current use of insulin ([facility])  Assessment & Plan:    Lab Results   Component Value Date    HGBA1C 6.2 2023   Hga1c 7.0 pt declines any new meds     Orders:  -     Albumin / creatinine urine ratio; Future; Expected date: 2024  -     POCT hemoglobin A1c           Subjective      Pt is here for interval visit and evaluation of multiple medical problems, review of medications, labs, Health Maintenance and any recent specialty consults        Review of Systems   Constitutional:  Negative for appetite change, chills, fatigue and fever.   Respiratory:  Negative for cough, chest tightness and shortness of breath.    Cardiovascular:  Negative for chest pain, palpitations and leg swelling.   Gastrointestinal:  Negative for abdominal pain, constipation, diarrhea, nausea and vomiting.   Genitourinary:  Negative for difficulty urinating and frequency.   Musculoskeletal:  Negative for arthralgias, back pain, gait problem and neck pain.   Skin:  Negative for rash.   Neurological:  Negative for dizziness, weakness, light-headedness, numbness and headaches.   Hematological:  Does not bruise/bleed easily.   Psychiatric/Behavioral:  Negative for dysphoric mood and sleep disturbance. The patient is not nervous/anxious.        Current Outpatient Medications on  File Prior to Visit   Medication Sig   • hydrochlorothiazide (HYDRODIURIL) 25 mg tablet Take 1 tablet (25 mg total) by mouth daily   • metFORMIN (GLUCOPHAGE-XR) 500 mg 24 hr tablet TAKE ONE TABLET BY MOUTH EVERY DAY WITH DINNER   • metoprolol succinate (TOPROL-XL) 100 mg 24 hr tablet TAKE ONE TABLET BY MOUTH EVERY DAY   • Multiple Vitamin (MULTIVITAMIN PO) Take by mouth in the morning   • omeprazole (PriLOSEC) 40 MG capsule TAKE ONE CAPSULE BY MOUTH EVERY DAY   • [DISCONTINUED] ALPRAZolam (XANAX) 0.25 mg tablet Take 1 tablet (0.25 mg total) by mouth daily at bedtime as needed for anxiety   • [DISCONTINUED] ALPRAZolam (XANAX) 0.25 mg tablet TAKE ONE TABLET BY MOUTH AT BEDTIME AS NEEDED FOR ANXIETY   • [DISCONTINUED] AMINO ACIDS PO Take by mouth in the morning (Patient not taking: Reported on 8/10/2023)   • [DISCONTINUED] cyclobenzaprine (FLEXERIL) 10 mg tablet Take 1 tablet (10 mg total) by mouth 3 (three) times a day as needed for muscle spasms (Patient not taking: Reported on 2/14/2024)   • [DISCONTINUED] fluticasone (FLONASE) 50 mcg/act nasal spray 1 spray into each nostril daily (Patient not taking: Reported on 8/10/2023)   • [DISCONTINUED] meloxicam (MOBIC) 15 mg tablet Take 1 tablet (15 mg total) by mouth daily With food (Patient not taking: Reported on 2/14/2024)       Objective     /72 (BP Location: Left arm, Patient Position: Sitting, Cuff Size: Large)   Pulse 90   Resp 20   Wt 128 kg (282 lb 6.4 oz)   SpO2 96%   BMI 42.94 kg/m²     Physical Exam  Vitals reviewed.   Constitutional:       General: He is not in acute distress.     Appearance: Normal appearance. He is well-developed. He is not ill-appearing.   HENT:      Mouth/Throat:      Mouth: Mucous membranes are moist.   Eyes:      Extraocular Movements: Extraocular movements intact.      Conjunctiva/sclera: Conjunctivae normal.      Pupils: Pupils are equal, round, and reactive to light.   Neck:      Thyroid: No thyromegaly.      Vascular: No  carotid bruit.   Cardiovascular:      Rate and Rhythm: Normal rate and regular rhythm.      Pulses: Normal pulses. no weak pulses.           Dorsalis pedis pulses are 2+ on the right side and 2+ on the left side.      Heart sounds: Normal heart sounds. No murmur heard.  Pulmonary:      Effort: Pulmonary effort is normal.      Breath sounds: Normal breath sounds.   Chest:      Chest wall: No tenderness.   Abdominal:      General: Bowel sounds are normal. There is no distension.      Palpations: Abdomen is soft.      Tenderness: There is no abdominal tenderness.   Musculoskeletal:      Cervical back: Normal range of motion and neck supple.   Feet:      Right foot:      Skin integrity: Dry skin present. No ulcer, skin breakdown, erythema, warmth or callus.      Left foot:      Skin integrity: Dry skin present. No ulcer, skin breakdown, erythema, warmth or callus.   Lymphadenopathy:      Cervical: No cervical adenopathy.   Skin:     General: Skin is warm and dry.   Neurological:      General: No focal deficit present.      Mental Status: He is alert and oriented to person, place, and time. Mental status is at baseline.      Cranial Nerves: No cranial nerve deficit.   Psychiatric:         Mood and Affect: Mood normal.         Behavior: Behavior normal.     Patient's shoes and socks removed.    Right Foot/Ankle   Right Foot Inspection  Skin Exam: skin normal, skin intact and dry skin. No warmth, no callus, no erythema, no maceration, no abnormal color, no pre-ulcer, no ulcer and no callus.     Toe Exam: No swelling, no tenderness, erythema and  no right toe deformity    Sensory   Vibration: intact  Proprioception: intact  Monofilament testing: intact    Vascular  The right DP pulse is 2+.     Right Toe  - Comprehensive Exam  Ecchymosis: none  Swelling: none   Tenderness: none       Left Foot/Ankle  Left Foot Inspection  Skin Exam: skin normal, skin intact and dry skin. No warmth, no erythema, no maceration, normal color,  no pre-ulcer, no ulcer and no callus.     Sensory   Vibration: intact  Proprioception: intact  Monofilament testing: intact    Vascular  The left DP pulse is 2+.     Left Toe  - Comprehensive Exam  Ecchymosis: none  Swelling: none   Tenderness: none       Assign Risk Category  No deformity present  No loss of protective sensation  No weak pulses  Risk: 0      Betty Carvalho MD

## 2024-02-14 NOTE — ASSESSMENT & PLAN NOTE
Lab Results   Component Value Date    HGBA1C 6.2 06/05/2023   Hga1c 7.0 pt declines any new meds

## 2024-02-16 DIAGNOSIS — R80.9 PROTEINURIA, UNSPECIFIED TYPE: Primary | ICD-10-CM

## 2024-02-16 RX ORDER — LOSARTAN POTASSIUM 25 MG/1
25 TABLET ORAL DAILY
Qty: 30 TABLET | Refills: 5 | Status: SHIPPED | OUTPATIENT
Start: 2024-02-16

## 2024-02-16 NOTE — TELEPHONE ENCOUNTER
----- Message from Betty Carvalho MD sent at 2/14/2024  3:31 PM EST -----  Has small amount of protein in urine  needs to be on low dose losartan to protect kidneys  losartan 25mg po qd 6 refills

## 2024-03-18 DIAGNOSIS — F41.1 GENERALIZED ANXIETY DISORDER: ICD-10-CM

## 2024-03-18 NOTE — TELEPHONE ENCOUNTER
Reason for call:   [x] Refill   [] Prior Auth  [] Other:     Office:   [x] PCP/Provider - Maia Childers are FM   [] Specialty/Provider -     Medication: xanax     Dose/Frequency: 0.25mg - daily     Quantity: 30    Pharmacy: [id number]    Does the patient have enough for 3 days?   [] Yes   [x] No - Send as HP to POD

## 2024-03-19 RX ORDER — ALPRAZOLAM 0.25 MG/1
0.25 TABLET ORAL
Qty: 30 TABLET | Refills: 0 | Status: SHIPPED | OUTPATIENT
Start: 2024-03-19

## 2024-03-25 DIAGNOSIS — I10 ESSENTIAL HYPERTENSION: ICD-10-CM

## 2024-03-25 RX ORDER — HYDROCHLOROTHIAZIDE 25 MG/1
25 TABLET ORAL DAILY
Qty: 30 TABLET | Refills: 5 | Status: SHIPPED | OUTPATIENT
Start: 2024-03-25

## 2024-05-05 DIAGNOSIS — E11.9 TYPE 2 DIABETES MELLITUS WITHOUT COMPLICATION, WITHOUT LONG-TERM CURRENT USE OF INSULIN (HCC): ICD-10-CM

## 2024-05-05 RX ORDER — METFORMIN HYDROCHLORIDE 500 MG/1
TABLET, EXTENDED RELEASE ORAL
Qty: 30 TABLET | Refills: 5 | Status: SHIPPED | OUTPATIENT
Start: 2024-05-05

## 2024-06-04 NOTE — TELEPHONE ENCOUNTER
Asya Syed was seen and treated in our emergency department on 6/4/2024.  She may return to work on 06/07/2024.  1-3 days if needed      If you have any questions or concerns, please don't hesitate to call.      Zainab Barahona MD Rx resent, pt informed

## 2024-06-05 DIAGNOSIS — F41.1 GENERALIZED ANXIETY DISORDER: ICD-10-CM

## 2024-06-06 RX ORDER — ALPRAZOLAM 0.25 MG/1
0.25 TABLET ORAL
Qty: 30 TABLET | Refills: 0 | Status: SHIPPED | OUTPATIENT
Start: 2024-06-06

## 2024-07-02 DIAGNOSIS — K20.90 ESOPHAGITIS: ICD-10-CM

## 2024-07-02 DIAGNOSIS — F41.1 GENERALIZED ANXIETY DISORDER: ICD-10-CM

## 2024-07-02 DIAGNOSIS — K22.70 BARRETT'S ESOPHAGUS WITHOUT DYSPLASIA: ICD-10-CM

## 2024-07-02 RX ORDER — OMEPRAZOLE 40 MG/1
CAPSULE, DELAYED RELEASE ORAL
Qty: 30 CAPSULE | Refills: 5 | Status: SHIPPED | OUTPATIENT
Start: 2024-07-02

## 2024-07-02 RX ORDER — ALPRAZOLAM 0.25 MG/1
TABLET ORAL
Qty: 30 TABLET | Refills: 0 | Status: SHIPPED | OUTPATIENT
Start: 2024-07-02

## 2024-07-14 DIAGNOSIS — I10 ESSENTIAL HYPERTENSION: ICD-10-CM

## 2024-07-14 RX ORDER — METOPROLOL SUCCINATE 100 MG/1
100 TABLET, EXTENDED RELEASE ORAL DAILY
Qty: 90 TABLET | Refills: 1 | Status: SHIPPED | OUTPATIENT
Start: 2024-07-14

## 2024-08-13 ENCOUNTER — OFFICE VISIT (OUTPATIENT)
Dept: FAMILY MEDICINE CLINIC | Facility: CLINIC | Age: 64
End: 2024-08-13

## 2024-08-13 VITALS
WEIGHT: 281 LBS | DIASTOLIC BLOOD PRESSURE: 88 MMHG | SYSTOLIC BLOOD PRESSURE: 138 MMHG | BODY MASS INDEX: 42.59 KG/M2 | RESPIRATION RATE: 18 BRPM | TEMPERATURE: 98 F | HEIGHT: 68 IN | OXYGEN SATURATION: 92 % | HEART RATE: 80 BPM

## 2024-08-13 DIAGNOSIS — E78.5 DYSLIPIDEMIA: ICD-10-CM

## 2024-08-13 DIAGNOSIS — Z13.9 SCREENING DUE: ICD-10-CM

## 2024-08-13 DIAGNOSIS — G47.33 OBSTRUCTIVE SLEEP APNEA TREATED WITH CONTINUOUS POSITIVE AIRWAY PRESSURE (CPAP): ICD-10-CM

## 2024-08-13 DIAGNOSIS — F41.1 GENERALIZED ANXIETY DISORDER: ICD-10-CM

## 2024-08-13 DIAGNOSIS — I10 ESSENTIAL HYPERTENSION: Primary | ICD-10-CM

## 2024-08-13 DIAGNOSIS — K21.00 GASTROESOPHAGEAL REFLUX DISEASE WITH ESOPHAGITIS WITHOUT HEMORRHAGE: ICD-10-CM

## 2024-08-13 DIAGNOSIS — E11.9 TYPE 2 DIABETES MELLITUS WITHOUT COMPLICATION, WITHOUT LONG-TERM CURRENT USE OF INSULIN (HCC): ICD-10-CM

## 2024-08-13 DIAGNOSIS — E66.01 CLASS 3 SEVERE OBESITY DUE TO EXCESS CALORIES WITH SERIOUS COMORBIDITY AND BODY MASS INDEX (BMI) OF 40.0 TO 44.9 IN ADULT (HCC): ICD-10-CM

## 2024-08-13 PROBLEM — T14.8XXA CONTUSION: Status: RESOLVED | Noted: 2023-01-30 | Resolved: 2024-08-13

## 2024-08-13 PROBLEM — M79.671 RIGHT FOOT PAIN: Status: RESOLVED | Noted: 2023-01-30 | Resolved: 2024-08-13

## 2024-08-13 PROBLEM — Z00.00 ANNUAL PHYSICAL EXAM: Status: RESOLVED | Noted: 2021-05-09 | Resolved: 2024-08-13

## 2024-08-13 LAB — SL AMB POCT HEMOGLOBIN AIC: 7.5 (ref ?–6.5)

## 2024-08-13 PROCEDURE — 83036 HEMOGLOBIN GLYCOSYLATED A1C: CPT | Performed by: FAMILY MEDICINE

## 2024-08-13 PROCEDURE — 99214 OFFICE O/P EST MOD 30 MIN: CPT | Performed by: FAMILY MEDICINE

## 2024-08-13 RX ORDER — ALPRAZOLAM 0.25 MG
0.25 TABLET ORAL
Qty: 30 TABLET | Refills: 0 | Status: SHIPPED | OUTPATIENT
Start: 2024-08-13

## 2024-08-13 RX ORDER — METFORMIN HCL 500 MG
1000 TABLET, EXTENDED RELEASE 24 HR ORAL
Qty: 60 TABLET | Refills: 5 | Status: SHIPPED | OUTPATIENT
Start: 2024-08-13

## 2024-08-13 NOTE — ASSESSMENT & PLAN NOTE
Lab Results   Component Value Date    HGBA1C 7.0 (A) 02/14/2024   Pt has no insurance and is behind on labs will  reorder  will increase metformin to 2 pills 1000 qd

## 2024-08-13 NOTE — PROGRESS NOTES
Ambulatory Visit  Name: Didier Baker      : 1960      MRN: [id number]  Encounter Provider: Betty Carvalho MD  Encounter Date: 2024   Encounter department: [facility]    Assessment & Plan   1. Essential hypertension  Assessment & Plan:  Well controlled on current therapy continue with current medications and will reassess next visit    2. Dyslipidemia  Assessment & Plan:  Due for labs   3. Generalized anxiety disorder  Assessment & Plan:  Xanax prn   Orders:  -     ALPRAZolam (XANAX) 0.25 mg tablet; Take 1 tablet (0.25 mg total) by mouth daily at bedtime as needed for anxiety  4. Gastroesophageal reflux disease with esophagitis without hemorrhage  Assessment & Plan:  Ok on meds   5. Type 2 diabetes mellitus without complication, without long-term current use of insulin ([facility])  Assessment & Plan:    Lab Results   Component Value Date    HGBA1C 7.0 (A) 2024   Pt has no insurance and is behind on labs will  reorder  will increase metformin to 2 pills 1000 qd   Orders:  -     POCT hemoglobin A1c  -     metFORMIN (GLUCOPHAGE-XR) 500 mg 24 hr tablet; Take 2 tablets (1,000 mg total) by mouth daily with dinner  6. Screening due  Assessment & Plan:  Check routine labs    Orders:  -     CBC and differential; Future  -     Comprehensive metabolic panel; Future; Expected date: 2024  -     Lipid panel; Future; Expected date: 2024  -     TSH, 3rd generation; Future  -     Urinalysis with microscopic  -     PSA, Total Screen; Future  7. Obstructive sleep apnea treated with continuous positive airway pressure (CPAP)  Assessment & Plan:  Ok on cpap  8. Class 3 severe obesity due to excess calories with serious comorbidity and body mass index (BMI) of 40.0 to 44.9 in adult ([facility])  Assessment & Plan:  Discussion on diet and exercise guidelines for weight loss and  health reviewed with pt          History of Present Illness     Pt is here for interval visit and evaluation of  "multiple medical problems, review of medications, labs, Health Maintenance pt has no insurance and is behind on labs             Review of Systems   Constitutional:  Negative for appetite change, chills, fatigue and fever.   Respiratory:  Negative for cough, chest tightness and shortness of breath.    Cardiovascular:  Negative for chest pain, palpitations and leg swelling.   Gastrointestinal:  Negative for abdominal pain, constipation, diarrhea, nausea and vomiting.   Genitourinary:  Negative for difficulty urinating and frequency.   Musculoskeletal:  Negative for arthralgias, back pain, gait problem and neck pain.   Skin:  Negative for rash.   Neurological:  Negative for dizziness, weakness, light-headedness, numbness and headaches.   Hematological:  Does not bruise/bleed easily.   Psychiatric/Behavioral:  Negative for dysphoric mood and sleep disturbance. The patient is not nervous/anxious.        Objective     /88 (BP Location: Left arm, Patient Position: Sitting, Cuff Size: Standard)   Pulse 80   Temp 98 °F (36.7 °C) (Tympanic)   Resp 18   Ht 5' 8\" (1.727 m)   Wt 127 kg (281 lb)   SpO2 92%   BMI 42.73 kg/m²     Physical Exam  Vitals and nursing note reviewed.   Constitutional:       General: He is not in acute distress.     Appearance: Normal appearance. He is well-developed. He is obese.   Eyes:      Conjunctiva/sclera: Conjunctivae normal.      Pupils: Pupils are equal, round, and reactive to light.   Neck:      Thyroid: No thyromegaly.   Cardiovascular:      Rate and Rhythm: Normal rate and regular rhythm.      Pulses: Normal pulses. no weak pulses.           Dorsalis pedis pulses are 2+ on the right side and 2+ on the left side.      Heart sounds: Normal heart sounds. No murmur heard.     No friction rub.   Pulmonary:      Effort: Pulmonary effort is normal. No respiratory distress.      Breath sounds: Normal breath sounds. No wheezing.   Abdominal:      General: Bowel sounds are normal. There " is no distension.      Palpations: Abdomen is soft. There is no mass.      Tenderness: There is no abdominal tenderness. There is no guarding.      Hernia: No hernia is present.   Musculoskeletal:      Cervical back: Normal range of motion and neck supple.      Right lower leg: No edema.      Left lower leg: No edema.   Feet:      Right foot:      Skin integrity: No ulcer, skin breakdown, erythema, warmth, callus or dry skin.      Left foot:      Skin integrity: No ulcer, skin breakdown, erythema, warmth, callus or dry skin.   Lymphadenopathy:      Cervical: No cervical adenopathy.   Skin:     General: Skin is warm and dry.      Findings: No erythema or rash.   Neurological:      General: No focal deficit present.      Mental Status: He is alert and oriented to person, place, and time.      Cranial Nerves: No cranial nerve deficit.      Motor: No weakness.      Gait: Gait normal.      Deep Tendon Reflexes: Reflexes normal.   Psychiatric:         Mood and Affect: Mood normal.     Patient's shoes and socks removed.    Right Foot/Ankle   Right Foot Inspection  Skin Exam: skin normal. Skin not intact, no dry skin, no warmth, no callus, no erythema, no maceration, no abnormal color, no pre-ulcer, no ulcer and no callus.     Toe Exam: ROM and strength within normal limits. No swelling, no tenderness, erythema and  no right toe deformity    Sensory   Vibration: intact  Proprioception: intact  Monofilament testing: intact    Vascular  The right DP pulse is 2+.     Right Toe  - Comprehensive Exam  Ecchymosis: none  Swelling: none   Tenderness: none       Left Foot/Ankle  Left Foot Inspection  Skin Exam: skin normal. Skin not intact, no dry skin, no warmth, no erythema, no maceration, normal color, no pre-ulcer, no ulcer and no callus.     Toe Exam: No swelling, no tenderness, no erythema and no left toe deformity.     Sensory   Vibration: intact  Proprioception: intact  Monofilament testing: intact    Vascular  The left DP  pulse is 2+.     Left Toe  - Comprehensive Exam  Ecchymosis: none  Swelling: none   Tenderness: none       Assign Risk Category  No deformity present  No loss of protective sensation  No weak pulses  Risk: 0      Administrative Statements

## 2024-08-14 ENCOUNTER — APPOINTMENT (OUTPATIENT)
Dept: LAB | Facility: CLINIC | Age: 64
End: 2024-08-14

## 2024-08-14 DIAGNOSIS — Z12.5 PROSTATE CANCER SCREENING: ICD-10-CM

## 2024-08-14 DIAGNOSIS — E78.5 DYSLIPIDEMIA: ICD-10-CM

## 2024-08-14 DIAGNOSIS — Z13.9 SCREENING DUE: ICD-10-CM

## 2024-08-14 DIAGNOSIS — I10 PRIMARY HYPERTENSION: ICD-10-CM

## 2024-08-14 LAB
ALBUMIN SERPL BCG-MCNC: 4 G/DL (ref 3.5–5)
ALP SERPL-CCNC: 75 U/L (ref 34–104)
ALT SERPL W P-5'-P-CCNC: 46 U/L (ref 7–52)
ANION GAP SERPL CALCULATED.3IONS-SCNC: 9 MMOL/L (ref 4–13)
AST SERPL W P-5'-P-CCNC: 39 U/L (ref 13–39)
BACTERIA UR QL AUTO: ABNORMAL /HPF
BASOPHILS # BLD AUTO: 0.05 THOUSANDS/ÂΜL (ref 0–0.1)
BASOPHILS NFR BLD AUTO: 1 % (ref 0–1)
BILIRUB SERPL-MCNC: 0.64 MG/DL (ref 0.2–1)
BILIRUB UR QL STRIP: NEGATIVE
BUN SERPL-MCNC: 18 MG/DL (ref 5–25)
CALCIUM SERPL-MCNC: 8.9 MG/DL (ref 8.4–10.2)
CHLORIDE SERPL-SCNC: 98 MMOL/L (ref 96–108)
CHOLEST SERPL-MCNC: 194 MG/DL
CLARITY UR: CLEAR
CO2 SERPL-SCNC: 30 MMOL/L (ref 21–32)
COLOR UR: ABNORMAL
CREAT SERPL-MCNC: 0.89 MG/DL (ref 0.6–1.3)
EOSINOPHIL # BLD AUTO: 0.21 THOUSAND/ÂΜL (ref 0–0.61)
EOSINOPHIL NFR BLD AUTO: 4 % (ref 0–6)
ERYTHROCYTE [DISTWIDTH] IN BLOOD BY AUTOMATED COUNT: 12.4 % (ref 11.6–15.1)
GFR SERPL CREATININE-BSD FRML MDRD: 91 ML/MIN/1.73SQ M
GLUCOSE P FAST SERPL-MCNC: 182 MG/DL (ref 65–99)
GLUCOSE UR STRIP-MCNC: ABNORMAL MG/DL
HCT VFR BLD AUTO: 39.7 % (ref 36.5–49.3)
HDLC SERPL-MCNC: 37 MG/DL
HGB BLD-MCNC: 13.9 G/DL (ref 12–17)
HGB UR QL STRIP.AUTO: NEGATIVE
IMM GRANULOCYTES # BLD AUTO: 0.01 THOUSAND/UL (ref 0–0.2)
IMM GRANULOCYTES NFR BLD AUTO: 0 % (ref 0–2)
KETONES UR STRIP-MCNC: NEGATIVE MG/DL
LDLC SERPL CALC-MCNC: 111 MG/DL (ref 0–100)
LEUKOCYTE ESTERASE UR QL STRIP: NEGATIVE
LYMPHOCYTES # BLD AUTO: 2.31 THOUSANDS/ÂΜL (ref 0.6–4.47)
LYMPHOCYTES NFR BLD AUTO: 39 % (ref 14–44)
MCH RBC QN AUTO: 33.6 PG (ref 26.8–34.3)
MCHC RBC AUTO-ENTMCNC: 35 G/DL (ref 31.4–37.4)
MCV RBC AUTO: 96 FL (ref 82–98)
MONOCYTES # BLD AUTO: 0.52 THOUSAND/ÂΜL (ref 0.17–1.22)
MONOCYTES NFR BLD AUTO: 9 % (ref 4–12)
NEUTROPHILS # BLD AUTO: 2.78 THOUSANDS/ÂΜL (ref 1.85–7.62)
NEUTS SEG NFR BLD AUTO: 47 % (ref 43–75)
NITRITE UR QL STRIP: NEGATIVE
NON-SQ EPI CELLS URNS QL MICRO: ABNORMAL /HPF
NONHDLC SERPL-MCNC: 157 MG/DL
NRBC BLD AUTO-RTO: 0 /100 WBCS
PH UR STRIP.AUTO: 6.5 [PH]
PLATELET # BLD AUTO: 183 THOUSANDS/UL (ref 149–390)
PMV BLD AUTO: 11 FL (ref 8.9–12.7)
POTASSIUM SERPL-SCNC: 3.6 MMOL/L (ref 3.5–5.3)
PROT SERPL-MCNC: 6.7 G/DL (ref 6.4–8.4)
PROT UR STRIP-MCNC: NEGATIVE MG/DL
PSA SERPL-MCNC: 1.06 NG/ML (ref 0–4)
RBC # BLD AUTO: 4.14 MILLION/UL (ref 3.88–5.62)
RBC #/AREA URNS AUTO: ABNORMAL /HPF
SODIUM SERPL-SCNC: 137 MMOL/L (ref 135–147)
SP GR UR STRIP.AUTO: 1.01 (ref 1–1.03)
TRIGL SERPL-MCNC: 230 MG/DL
TSH SERPL DL<=0.05 MIU/L-ACNC: 1.78 UIU/ML (ref 0.45–4.5)
UROBILINOGEN UR STRIP-ACNC: <2 MG/DL
WBC # BLD AUTO: 5.88 THOUSAND/UL (ref 4.31–10.16)
WBC #/AREA URNS AUTO: ABNORMAL /HPF

## 2024-08-14 PROCEDURE — 85025 COMPLETE CBC W/AUTO DIFF WBC: CPT

## 2024-08-14 PROCEDURE — 84443 ASSAY THYROID STIM HORMONE: CPT

## 2024-08-14 PROCEDURE — 80053 COMPREHEN METABOLIC PANEL: CPT

## 2024-08-14 PROCEDURE — G0103 PSA SCREENING: HCPCS

## 2024-08-14 PROCEDURE — 81001 URINALYSIS AUTO W/SCOPE: CPT | Performed by: FAMILY MEDICINE

## 2024-08-14 PROCEDURE — 80061 LIPID PANEL: CPT

## 2024-08-14 PROCEDURE — 36415 COLL VENOUS BLD VENIPUNCTURE: CPT

## 2024-09-11 DIAGNOSIS — F41.1 GENERALIZED ANXIETY DISORDER: ICD-10-CM

## 2024-09-11 RX ORDER — ALPRAZOLAM 0.25 MG
0.25 TABLET ORAL
Qty: 30 TABLET | Refills: 0 | Status: SHIPPED | OUTPATIENT
Start: 2024-09-11

## 2024-09-11 NOTE — TELEPHONE ENCOUNTER
Reason for call:   [x] Refill   [] Prior Auth  [] Other:     Office:   [x] PCP/Provider - Betty Carvalho MD   [] Specialty/Provider -     Medication: ALPRAZolam (XANAX) 0.25 mg tablet     Dose/Frequency: Take 1 tablet (0.25 mg total) by mouth daily at bedtime as needed for anxiety     Quantity: 30    [phone]    Does the patient have enough for 3 days?   [] Yes   [x] No - Send as HP to POD

## 2024-09-14 DIAGNOSIS — R80.9 PROTEINURIA, UNSPECIFIED TYPE: ICD-10-CM

## 2024-09-15 RX ORDER — LOSARTAN POTASSIUM 25 MG/1
25 TABLET ORAL DAILY
Qty: 30 TABLET | Refills: 5 | Status: SHIPPED | OUTPATIENT
Start: 2024-09-15

## 2024-09-16 NOTE — TELEPHONE ENCOUNTER
Patient called requesting refill for losartan. Patient made aware medication was refilled on 09/15/24 for 30 with 5 refills to [facility] pharmacy. Patient instructed to contact the pharmacy to obtain refills of medication. Patient verbalized understanding.

## 2024-10-03 DIAGNOSIS — I10 ESSENTIAL HYPERTENSION: ICD-10-CM

## 2024-10-03 DIAGNOSIS — F41.1 GENERALIZED ANXIETY DISORDER: ICD-10-CM

## 2024-10-03 RX ORDER — HYDROCHLOROTHIAZIDE 25 MG/1
25 TABLET ORAL DAILY
Qty: 30 TABLET | Refills: 0 | Status: SHIPPED | OUTPATIENT
Start: 2024-10-03

## 2024-10-04 DIAGNOSIS — F41.1 GENERALIZED ANXIETY DISORDER: ICD-10-CM

## 2024-10-04 RX ORDER — ALPRAZOLAM 0.25 MG
0.25 TABLET ORAL
Qty: 30 TABLET | Refills: 0 | Status: SHIPPED | OUTPATIENT
Start: 2024-10-04 | End: 2024-10-04 | Stop reason: SDUPTHER

## 2024-10-04 RX ORDER — ALPRAZOLAM 0.25 MG
0.25 TABLET ORAL
Qty: 30 TABLET | Refills: 0 | Status: SHIPPED | OUTPATIENT
Start: 2024-10-04

## 2024-11-10 DIAGNOSIS — F41.1 GENERALIZED ANXIETY DISORDER: ICD-10-CM

## 2024-11-10 DIAGNOSIS — I10 ESSENTIAL HYPERTENSION: ICD-10-CM

## 2024-11-11 RX ORDER — HYDROCHLOROTHIAZIDE 25 MG/1
25 TABLET ORAL DAILY
Qty: 90 TABLET | Refills: 1 | Status: SHIPPED | OUTPATIENT
Start: 2024-11-11

## 2024-11-11 RX ORDER — ALPRAZOLAM 0.25 MG/1
TABLET ORAL
Qty: 30 TABLET | Refills: 0 | Status: SHIPPED | OUTPATIENT
Start: 2024-11-11

## 2024-12-13 DIAGNOSIS — F41.1 GENERALIZED ANXIETY DISORDER: ICD-10-CM

## 2024-12-13 RX ORDER — ALPRAZOLAM 0.25 MG/1
TABLET ORAL
Qty: 30 TABLET | Refills: 0 | Status: SHIPPED | OUTPATIENT
Start: 2024-12-13

## 2025-01-12 DIAGNOSIS — F41.1 GENERALIZED ANXIETY DISORDER: ICD-10-CM

## 2025-01-13 RX ORDER — ALPRAZOLAM 0.25 MG/1
0.25 TABLET ORAL DAILY PRN
Qty: 30 TABLET | Refills: 0 | Status: SHIPPED | OUTPATIENT
Start: 2025-01-13

## 2025-01-14 DIAGNOSIS — I10 ESSENTIAL HYPERTENSION: ICD-10-CM

## 2025-01-14 RX ORDER — METOPROLOL SUCCINATE 100 MG/1
100 TABLET, EXTENDED RELEASE ORAL DAILY
Qty: 90 TABLET | Refills: 1 | Status: SHIPPED | OUTPATIENT
Start: 2025-01-14

## 2025-02-07 NOTE — ASSESSMENT & PLAN NOTE
Check routine labs    Orders:  •  CBC and differential; Future  •  Comprehensive metabolic panel; Future  •  Lipid panel; Future  •  TSH, 3rd generation; Future

## 2025-02-07 NOTE — ASSESSMENT & PLAN NOTE
Uses xanax prn    Orders:  •  ALPRAZolam (XANAX) 0.25 mg tablet; Take 1 tablet (0.25 mg total) by mouth daily as needed for anxiety

## 2025-02-07 NOTE — PROGRESS NOTES
Name: Didier Baker      : 1960      MRN: [id number]  Encounter Provider: Betty Carvalho MD  Encounter Date: 2025   Encounter department: [facility] MEDICINE  :  Assessment & Plan  Annual physical exam  Check routine labs    Orders:  •  CBC and differential; Future  •  Comprehensive metabolic panel; Future  •  Lipid panel; Future  •  TSH, 3rd generation; Future    Class 3 severe obesity due to excess calories with serious comorbidity and body mass index (BMI) of 40.0 to 44.9 in adult ([facility])  Discussion on diet and exercise guidelines for weight loss and  health reviewed with pt          Dyslipidemia  Check lipids        Essential hypertension  Well controlled on current therapy continue with current medications and will reassess next visit'       Gastroesophageal reflux disease with esophagitis without hemorrhage  Ok on omeprazole       Generalized anxiety disorder  Uses xanax prn    Orders:  •  ALPRAZolam (XANAX) 0.25 mg tablet; Take 1 tablet (0.25 mg total) by mouth daily as needed for anxiety    Type 2 diabetes mellitus without complication, without long-term current use of insulin ([facility])    Lab Results   Component Value Date    HGBA1C 6.8 (A) 2025   Check hga1c  Orders:  •  Albumin / creatinine urine ratio; Future  •  IRIS Diabetic eye exam  •  POCT hemoglobin A1c    Encounter for immunization    Orders:  •  influenza vaccine, recombinant, PF, 0.5 mL IM (Flublok)    Mancia's esophagus without dysplasia    Orders:  •  omeprazole (PriLOSEC) 40 MG capsule; Take 1 capsule (40 mg total) by mouth daily    Esophagitis    Orders:  •  omeprazole (PriLOSEC) 40 MG capsule; Take 1 capsule (40 mg total) by mouth daily           History of Present Illness   Patient here for annual physical Pt is here for interval visit and evaluation of multiple medical problems, review of medications, labs, Health Maintenance and any recent specialty consults        Review of Systems   Constitutional:   "Negative for appetite change, chills, fatigue and fever.   Respiratory:  Negative for cough, chest tightness and shortness of breath.    Cardiovascular:  Negative for chest pain, palpitations and leg swelling.   Gastrointestinal:  Negative for abdominal pain, constipation, diarrhea, nausea and vomiting.   Genitourinary:  Negative for difficulty urinating and frequency.   Musculoskeletal:  Negative for arthralgias, back pain, gait problem and neck pain.   Skin:  Negative for rash.   Neurological:  Negative for dizziness, weakness, light-headedness, numbness and headaches.   Hematological:  Does not bruise/bleed easily.   Psychiatric/Behavioral:  Negative for dysphoric mood and sleep disturbance. The patient is not nervous/anxious.        Objective   /82 (BP Location: Left arm, Patient Position: Sitting, Cuff Size: Large)   Pulse 66   Temp 98 °F (36.7 °C) (Tympanic)   Resp 16   Ht 5' 8\" (1.727 m)   Wt 129 kg (285 lb)   SpO2 97%   BMI 43.33 kg/m²      Physical Exam  Vitals and nursing note reviewed.   Constitutional:       General: He is not in acute distress.     Appearance: Normal appearance. He is well-developed. He is obese.   HENT:      Right Ear: Tympanic membrane and ear canal normal.      Left Ear: Tympanic membrane and ear canal normal.      Mouth/Throat:      Mouth: Mucous membranes are moist.   Eyes:      Extraocular Movements: Extraocular movements intact.      Conjunctiva/sclera: Conjunctivae normal.      Pupils: Pupils are equal, round, and reactive to light.   Neck:      Thyroid: No thyromegaly.      Vascular: No carotid bruit.   Cardiovascular:      Rate and Rhythm: Normal rate and regular rhythm.      Pulses: Normal pulses.      Heart sounds: Normal heart sounds. No murmur heard.     No friction rub.   Pulmonary:      Effort: Pulmonary effort is normal. No respiratory distress.      Breath sounds: Normal breath sounds. No wheezing or rales.   Abdominal:      General: Bowel sounds are " normal. There is no distension.      Palpations: Abdomen is soft. There is no mass.      Tenderness: There is no abdominal tenderness. There is no guarding.      Hernia: No hernia is present.   Musculoskeletal:      Cervical back: Normal range of motion and neck supple.      Right lower leg: No edema.      Left lower leg: No edema.   Lymphadenopathy:      Cervical: No cervical adenopathy.   Skin:     General: Skin is warm and dry.      Capillary Refill: Capillary refill takes less than 2 seconds.      Findings: No erythema or rash.      Comments: No abnormal moles   Neurological:      General: No focal deficit present.      Mental Status: He is alert and oriented to person, place, and time.      Cranial Nerves: No cranial nerve deficit.      Sensory: No sensory deficit.      Motor: No weakness.      Coordination: Coordination normal.      Gait: Gait normal.      Deep Tendon Reflexes: Reflexes normal.   Psychiatric:         Mood and Affect: Mood normal.         Behavior: Behavior normal.         Thought Content: Thought content normal.

## 2025-02-07 NOTE — ASSESSMENT & PLAN NOTE
Well controlled on current therapy continue with current medications and will reassess next visit'

## 2025-02-13 ENCOUNTER — RESULTS FOLLOW-UP (OUTPATIENT)
Dept: FAMILY MEDICINE CLINIC | Facility: CLINIC | Age: 65
End: 2025-02-13

## 2025-02-13 ENCOUNTER — OFFICE VISIT (OUTPATIENT)
Dept: FAMILY MEDICINE CLINIC | Facility: CLINIC | Age: 65
End: 2025-02-13
Payer: COMMERCIAL

## 2025-02-13 VITALS
WEIGHT: 285 LBS | RESPIRATION RATE: 16 BRPM | HEIGHT: 68 IN | OXYGEN SATURATION: 97 % | SYSTOLIC BLOOD PRESSURE: 122 MMHG | DIASTOLIC BLOOD PRESSURE: 82 MMHG | BODY MASS INDEX: 43.19 KG/M2 | HEART RATE: 66 BPM | TEMPERATURE: 98 F

## 2025-02-13 DIAGNOSIS — F41.1 GENERALIZED ANXIETY DISORDER: ICD-10-CM

## 2025-02-13 DIAGNOSIS — Z00.00 ANNUAL PHYSICAL EXAM: Primary | ICD-10-CM

## 2025-02-13 DIAGNOSIS — Z23 ENCOUNTER FOR IMMUNIZATION: ICD-10-CM

## 2025-02-13 DIAGNOSIS — E11.9 TYPE 2 DIABETES MELLITUS WITHOUT COMPLICATION, WITHOUT LONG-TERM CURRENT USE OF INSULIN (HCC): ICD-10-CM

## 2025-02-13 DIAGNOSIS — E66.01 CLASS 3 SEVERE OBESITY DUE TO EXCESS CALORIES WITH SERIOUS COMORBIDITY AND BODY MASS INDEX (BMI) OF 40.0 TO 44.9 IN ADULT (HCC): ICD-10-CM

## 2025-02-13 DIAGNOSIS — I10 ESSENTIAL HYPERTENSION: ICD-10-CM

## 2025-02-13 DIAGNOSIS — E78.5 DYSLIPIDEMIA: ICD-10-CM

## 2025-02-13 DIAGNOSIS — K20.90 ESOPHAGITIS: ICD-10-CM

## 2025-02-13 DIAGNOSIS — K22.70 BARRETT'S ESOPHAGUS WITHOUT DYSPLASIA: ICD-10-CM

## 2025-02-13 DIAGNOSIS — E66.813 CLASS 3 SEVERE OBESITY DUE TO EXCESS CALORIES WITH SERIOUS COMORBIDITY AND BODY MASS INDEX (BMI) OF 40.0 TO 44.9 IN ADULT (HCC): ICD-10-CM

## 2025-02-13 DIAGNOSIS — K21.00 GASTROESOPHAGEAL REFLUX DISEASE WITH ESOPHAGITIS WITHOUT HEMORRHAGE: ICD-10-CM

## 2025-02-13 LAB
CREAT UR-MCNC: 75.5 MG/DL
LEFT EYE DIABETIC RETINOPATHY: NORMAL
LEFT EYE IMAGE QUALITY: NORMAL
LEFT EYE MACULAR EDEMA: NORMAL
LEFT EYE OTHER RETINOPATHY: NORMAL
MICROALBUMIN UR-MCNC: <7 MG/L
RIGHT EYE DIABETIC RETINOPATHY: NORMAL
RIGHT EYE IMAGE QUALITY: NORMAL
RIGHT EYE MACULAR EDEMA: NORMAL
RIGHT EYE OTHER RETINOPATHY: NORMAL
SEVERITY (EYE EXAM): NORMAL
SL AMB POCT HEMOGLOBIN AIC: 6.8 (ref ?–6.5)

## 2025-02-13 PROCEDURE — 99396 PREV VISIT EST AGE 40-64: CPT | Performed by: FAMILY MEDICINE

## 2025-02-13 PROCEDURE — 82570 ASSAY OF URINE CREATININE: CPT | Performed by: FAMILY MEDICINE

## 2025-02-13 PROCEDURE — 83036 HEMOGLOBIN GLYCOSYLATED A1C: CPT | Performed by: FAMILY MEDICINE

## 2025-02-13 PROCEDURE — 82043 UR ALBUMIN QUANTITATIVE: CPT | Performed by: FAMILY MEDICINE

## 2025-02-13 PROCEDURE — 99214 OFFICE O/P EST MOD 30 MIN: CPT | Performed by: FAMILY MEDICINE

## 2025-02-13 RX ORDER — OMEPRAZOLE 40 MG/1
40 CAPSULE, DELAYED RELEASE ORAL DAILY
Qty: 30 CAPSULE | Refills: 5 | Status: SHIPPED | OUTPATIENT
Start: 2025-02-13

## 2025-02-13 RX ORDER — ALPRAZOLAM 0.25 MG/1
0.25 TABLET ORAL DAILY PRN
Qty: 30 TABLET | Refills: 0 | Status: SHIPPED | OUTPATIENT
Start: 2025-02-13

## 2025-02-17 ENCOUNTER — APPOINTMENT (OUTPATIENT)
Dept: LAB | Facility: CLINIC | Age: 65
End: 2025-02-17
Payer: COMMERCIAL

## 2025-02-17 DIAGNOSIS — Z00.00 ANNUAL PHYSICAL EXAM: ICD-10-CM

## 2025-02-17 LAB
ALBUMIN SERPL BCG-MCNC: 4.1 G/DL (ref 3.5–5)
ALP SERPL-CCNC: 80 U/L (ref 34–104)
ALT SERPL W P-5'-P-CCNC: 37 U/L (ref 7–52)
ANION GAP SERPL CALCULATED.3IONS-SCNC: 15 MMOL/L (ref 4–13)
AST SERPL W P-5'-P-CCNC: 24 U/L (ref 13–39)
BASOPHILS # BLD AUTO: 0.05 THOUSANDS/ΜL (ref 0–0.1)
BASOPHILS NFR BLD AUTO: 1 % (ref 0–1)
BILIRUB SERPL-MCNC: 0.59 MG/DL (ref 0.2–1)
BUN SERPL-MCNC: 15 MG/DL (ref 5–25)
CALCIUM SERPL-MCNC: 9.4 MG/DL (ref 8.4–10.2)
CHLORIDE SERPL-SCNC: 98 MMOL/L (ref 96–108)
CHOLEST SERPL-MCNC: 183 MG/DL (ref ?–200)
CO2 SERPL-SCNC: 26 MMOL/L (ref 21–32)
CREAT SERPL-MCNC: 0.87 MG/DL (ref 0.6–1.3)
EOSINOPHIL # BLD AUTO: 0.32 THOUSAND/ΜL (ref 0–0.61)
EOSINOPHIL NFR BLD AUTO: 5 % (ref 0–6)
ERYTHROCYTE [DISTWIDTH] IN BLOOD BY AUTOMATED COUNT: 12.7 % (ref 11.6–15.1)
GFR SERPL CREATININE-BSD FRML MDRD: 92 ML/MIN/1.73SQ M
GLUCOSE P FAST SERPL-MCNC: 133 MG/DL (ref 65–99)
HCT VFR BLD AUTO: 40.5 % (ref 36.5–49.3)
HDLC SERPL-MCNC: 36 MG/DL
HGB BLD-MCNC: 13.9 G/DL (ref 12–17)
IMM GRANULOCYTES # BLD AUTO: 0.02 THOUSAND/UL (ref 0–0.2)
IMM GRANULOCYTES NFR BLD AUTO: 0 % (ref 0–2)
LDLC SERPL CALC-MCNC: 96 MG/DL (ref 0–100)
LYMPHOCYTES # BLD AUTO: 3.16 THOUSANDS/ΜL (ref 0.6–4.47)
LYMPHOCYTES NFR BLD AUTO: 45 % (ref 14–44)
MCH RBC QN AUTO: 32.6 PG (ref 26.8–34.3)
MCHC RBC AUTO-ENTMCNC: 34.3 G/DL (ref 31.4–37.4)
MCV RBC AUTO: 95 FL (ref 82–98)
MONOCYTES # BLD AUTO: 0.59 THOUSAND/ΜL (ref 0.17–1.22)
MONOCYTES NFR BLD AUTO: 8 % (ref 4–12)
NEUTROPHILS # BLD AUTO: 2.85 THOUSANDS/ΜL (ref 1.85–7.62)
NEUTS SEG NFR BLD AUTO: 41 % (ref 43–75)
NONHDLC SERPL-MCNC: 147 MG/DL
NRBC BLD AUTO-RTO: 0 /100 WBCS
PLATELET # BLD AUTO: 179 THOUSANDS/UL (ref 149–390)
PMV BLD AUTO: 10.4 FL (ref 8.9–12.7)
POTASSIUM SERPL-SCNC: 3.8 MMOL/L (ref 3.5–5.3)
PROT SERPL-MCNC: 6.8 G/DL (ref 6.4–8.4)
RBC # BLD AUTO: 4.27 MILLION/UL (ref 3.88–5.62)
SODIUM SERPL-SCNC: 139 MMOL/L (ref 135–147)
TRIGL SERPL-MCNC: 257 MG/DL (ref ?–150)
TSH SERPL DL<=0.05 MIU/L-ACNC: 3.15 UIU/ML (ref 0.45–4.5)
WBC # BLD AUTO: 6.99 THOUSAND/UL (ref 4.31–10.16)

## 2025-02-17 PROCEDURE — 80061 LIPID PANEL: CPT

## 2025-02-17 PROCEDURE — 84443 ASSAY THYROID STIM HORMONE: CPT

## 2025-02-17 PROCEDURE — 85025 COMPLETE CBC W/AUTO DIFF WBC: CPT

## 2025-02-17 PROCEDURE — 36415 COLL VENOUS BLD VENIPUNCTURE: CPT

## 2025-02-17 PROCEDURE — 80053 COMPREHEN METABOLIC PANEL: CPT

## 2025-03-12 DIAGNOSIS — F41.1 GENERALIZED ANXIETY DISORDER: ICD-10-CM

## 2025-03-13 RX ORDER — ALPRAZOLAM 0.25 MG
0.25 TABLET ORAL DAILY PRN
Qty: 30 TABLET | Refills: 0 | Status: SHIPPED | OUTPATIENT
Start: 2025-03-13

## 2025-03-22 DIAGNOSIS — R80.9 PROTEINURIA, UNSPECIFIED TYPE: ICD-10-CM

## 2025-03-23 RX ORDER — LOSARTAN POTASSIUM 25 MG/1
25 TABLET ORAL DAILY
Qty: 30 TABLET | Refills: 5 | Status: SHIPPED | OUTPATIENT
Start: 2025-03-23

## 2025-04-14 DIAGNOSIS — F41.1 GENERALIZED ANXIETY DISORDER: ICD-10-CM

## 2025-04-14 RX ORDER — ALPRAZOLAM 0.25 MG
0.25 TABLET ORAL DAILY PRN
Qty: 30 TABLET | Refills: 0 | Status: SHIPPED | OUTPATIENT
Start: 2025-04-14

## 2025-05-14 DIAGNOSIS — F41.1 GENERALIZED ANXIETY DISORDER: ICD-10-CM

## 2025-05-14 RX ORDER — ALPRAZOLAM 0.25 MG
0.25 TABLET ORAL DAILY PRN
Qty: 30 TABLET | Refills: 0 | Status: SHIPPED | OUTPATIENT
Start: 2025-05-14

## 2025-05-20 DIAGNOSIS — I10 ESSENTIAL HYPERTENSION: ICD-10-CM

## 2025-05-20 RX ORDER — HYDROCHLOROTHIAZIDE 25 MG/1
25 TABLET ORAL DAILY
Qty: 90 TABLET | Refills: 1 | Status: SHIPPED | OUTPATIENT
Start: 2025-05-20

## 2025-06-06 DIAGNOSIS — E11.9 TYPE 2 DIABETES MELLITUS WITHOUT COMPLICATION, WITHOUT LONG-TERM CURRENT USE OF INSULIN (HCC): ICD-10-CM

## 2025-06-06 RX ORDER — METFORMIN HYDROCHLORIDE 500 MG/1
1000 TABLET, EXTENDED RELEASE ORAL
Qty: 60 TABLET | Refills: 5 | Status: SHIPPED | OUTPATIENT
Start: 2025-06-06

## 2025-06-06 NOTE — TELEPHONE ENCOUNTER
Patient calling to check status. Patient leaving for vacation tomorrow at 6am and needs filled asap. Patient is out of medication

## 2025-06-21 DIAGNOSIS — F41.1 GENERALIZED ANXIETY DISORDER: ICD-10-CM

## 2025-06-23 RX ORDER — ALPRAZOLAM 0.25 MG
0.25 TABLET ORAL DAILY PRN
Qty: 30 TABLET | Refills: 0 | OUTPATIENT
Start: 2025-06-23

## 2025-06-23 RX ORDER — ALPRAZOLAM 0.25 MG
TABLET ORAL
Qty: 30 TABLET | Refills: 0 | Status: SHIPPED | OUTPATIENT
Start: 2025-06-23

## 2025-06-24 ENCOUNTER — TELEPHONE (OUTPATIENT)
Dept: GASTROENTEROLOGY | Facility: CLINIC | Age: 65
End: 2025-06-24

## 2025-07-15 ENCOUNTER — VBI (OUTPATIENT)
Dept: ADMINISTRATIVE | Facility: OTHER | Age: 65
End: 2025-07-15

## 2025-07-27 DIAGNOSIS — F41.1 GENERALIZED ANXIETY DISORDER: ICD-10-CM

## 2025-07-28 RX ORDER — ALPRAZOLAM 0.25 MG
0.25 TABLET ORAL
Qty: 30 TABLET | Refills: 0 | Status: SHIPPED | OUTPATIENT
Start: 2025-07-28

## 2025-08-04 ENCOUNTER — TELEPHONE (OUTPATIENT)
Age: 65
End: 2025-08-04

## 2025-08-04 ENCOUNTER — PREP FOR PROCEDURE (OUTPATIENT)
Age: 65
End: 2025-08-04

## 2025-08-04 DIAGNOSIS — Z12.11 SCREENING FOR COLON CANCER: Primary | ICD-10-CM

## 2025-08-11 PROBLEM — K21.9 GASTROESOPHAGEAL REFLUX DISEASE WITHOUT ESOPHAGITIS: Status: ACTIVE | Noted: 2023-01-30

## 2025-08-14 ENCOUNTER — OFFICE VISIT (OUTPATIENT)
Dept: FAMILY MEDICINE CLINIC | Facility: CLINIC | Age: 65
End: 2025-08-14
Payer: COMMERCIAL